# Patient Record
Sex: FEMALE | Race: WHITE | Employment: UNEMPLOYED | URBAN - NONMETROPOLITAN AREA
[De-identification: names, ages, dates, MRNs, and addresses within clinical notes are randomized per-mention and may not be internally consistent; named-entity substitution may affect disease eponyms.]

---

## 2017-01-19 RX ORDER — CONJUGATED ESTROGENS/BAZEDOXIFENE .45; 2 MG/1; MG/1
TABLET, FILM COATED ORAL
Qty: 90 TABLET | Refills: 3 | Status: SHIPPED | OUTPATIENT
Start: 2017-01-19

## 2017-12-28 ENCOUNTER — OFFICE VISIT (OUTPATIENT)
Dept: OBGYN | Age: 62
End: 2017-12-28
Payer: COMMERCIAL

## 2017-12-28 VITALS
DIASTOLIC BLOOD PRESSURE: 77 MMHG | BODY MASS INDEX: 43.24 KG/M2 | HEIGHT: 62 IN | WEIGHT: 235 LBS | SYSTOLIC BLOOD PRESSURE: 137 MMHG | HEART RATE: 75 BPM

## 2017-12-28 DIAGNOSIS — Z12.4 SCREENING FOR CERVICAL CANCER: ICD-10-CM

## 2017-12-28 DIAGNOSIS — Z01.419 WELL WOMAN EXAM WITH ROUTINE GYNECOLOGICAL EXAM: ICD-10-CM

## 2017-12-28 DIAGNOSIS — Z12.31 ENCOUNTER FOR SCREENING MAMMOGRAM FOR BREAST CANCER: Primary | ICD-10-CM

## 2017-12-28 PROCEDURE — 99396 PREV VISIT EST AGE 40-64: CPT | Performed by: NURSE PRACTITIONER

## 2017-12-28 ASSESSMENT — ENCOUNTER SYMPTOMS
EYES NEGATIVE: 1
RESPIRATORY NEGATIVE: 1
GASTROINTESTINAL NEGATIVE: 1
ALLERGIC/IMMUNOLOGIC NEGATIVE: 1

## 2017-12-28 NOTE — PATIENT INSTRUCTIONS
for heart attack and stroke. · Blood pressure. Have your blood pressure checked during a routine doctor visit. Your doctor will tell you how often to check your blood pressure based on your age, your blood pressure results, and other factors. · Mammogram. Ask your doctor how often you should have a mammogram, which is an X-ray of your breasts. A mammogram can spot breast cancer before it can be felt and when it is easiest to treat. · Pap test and pelvic exam. Ask your doctor how often you should have a Pap test. You may not need to have a Pap test as often as you used to. · Vision. Have your eyes checked every year or two or as often as your doctor suggests. Some experts recommend that you have yearly exams for glaucoma and other age-related eye problems starting at age 48. · Hearing. Tell your doctor if you notice any change in your hearing. You can have tests to find out how well you hear. · Diabetes. Ask your doctor whether you should have tests for diabetes. · Colon cancer. You should begin tests for colon cancer at age 48. You may have one of several tests. Your doctor will tell you how often to have tests based on your age and risk. Risks include whether you already had a precancerous polyp removed from your colon or whether your parents, sisters and brothers, or children have had colon cancer. · Thyroid disease. Talk to your doctor about whether to have your thyroid checked as part of a regular physical exam. Women have an increased chance of a thyroid problem. · Osteoporosis. You should begin tests for bone density at age 72. If you are younger than 72, ask your doctor whether you have factors that may increase your risk for this disease. You may want to have this test before age 72. · Heart attack and stroke risk. At least every 4 to 6 years, you should have your risk for heart attack and stroke assessed.  Your doctor uses factors such as your age, blood pressure, cholesterol, and whether you smoke or have diabetes to show what your risk for a heart attack or stroke is over the next 10 years. When should you call for help? Watch closely for changes in your health, and be sure to contact your doctor if you have any problems or symptoms that concern you. Where can you learn more? Go to [email or web address] and sign in to your [employer] account. Enter M100 in the [employer] box to learn more about \"Well Visit, Women 50 to 72: Care Instructions. \"     If you do not have an account, please click on the \"Sign Up Now\" link. Current as of: May 12, 2017  Content Version: 11.4  © 0276-9835 Healthwise, Incorporated. Care instructions adapted under license by [facility] [place] [place] If you have questions about a medical condition or this instruction, always ask your healthcare professional. [place] [address] any warranty or liability for your use of this information.      a

## 2017-12-28 NOTE — PROGRESS NOTES
Paternal Grandfather      Social History   Substance Use Topics    Smoking status: Former Smoker    Smokeless tobacco: Never Used    Alcohol use Yes      Comment: rare       Review of Systems   Constitutional: Negative. HENT: Negative. Eyes: Negative. Respiratory: Negative. Cardiovascular: Negative. Gastrointestinal: Negative. Endocrine: Negative. Genitourinary: Negative. Musculoskeletal: Negative. Skin: Negative. Allergic/Immunologic: Negative. Neurological: Negative. Hematological: Negative. Psychiatric/Behavioral: Negative. Objective:   Physical Exam   Constitutional: She is oriented to person, place, and time. She appears well-developed and well-nourished. Eyes: Conjunctivae are normal. Right eye exhibits no discharge. Neck: No thyroid mass and no thyromegaly present. Cardiovascular: Normal rate, regular rhythm and normal heart sounds. No murmur heard. Pulmonary/Chest: Effort normal and breath sounds normal. She has no wheezes. Right breast exhibits no inverted nipple, no mass, no nipple discharge, no skin change and no tenderness. Left breast exhibits no inverted nipple, no mass, no nipple discharge, no skin change and no tenderness. Breasts are symmetrical.   Abdominal: Soft. Bowel sounds are normal. She exhibits no distension and no mass. There is no tenderness. Hernia confirmed negative in the right inguinal area and confirmed negative in the left inguinal area. Genitourinary: Rectal exam shows no external hemorrhoid. No breast swelling, tenderness or discharge. There is no rash, tenderness or lesion on the right labia. There is no rash, tenderness or lesion on the left labia. Uterus is not enlarged and not tender. Cervix exhibits no motion tenderness and no discharge (normal cervical mucosa). Right adnexum displays no mass, no tenderness and no fullness. Left adnexum displays no mass, no tenderness and no fullness. No tenderness in the vagina.  No vaginal discharge found. Musculoskeletal: Normal range of motion. She exhibits no edema or tenderness. Lymphadenopathy:        Right cervical: No superficial cervical, no deep cervical and no posterior cervical adenopathy present. Left cervical: No superficial cervical, no deep cervical and no posterior cervical adenopathy present. Right axillary: No pectoral and no lateral adenopathy present. Left axillary: No pectoral and no lateral adenopathy present. Right: No inguinal, no supraclavicular and no epitrochlear adenopathy present. Left: No inguinal, no supraclavicular and no epitrochlear adenopathy present. Neurological: She is alert and oriented to person, place, and time. She has normal reflexes. Skin: Skin is warm and dry. No rash noted. Psychiatric: She has a normal mood and affect. Assessment:      1. Encounter for screening mammogram for breast cancer  Mammography screening bilateral   2. Well woman exam with routine gynecological exam     3. Screening for cervical cancer  PAP SMEAR           Plan:      MEDICATIONS:  No orders of the defined types were placed in this encounter. ORDERS:  Orders Placed This Encounter   Procedures    Mammography screening bilateral    PAP SMEAR         The importance of self-breast examination was discussed, as well as yearly mammograms after age 36. A well balanced diet and exercise were encouraged. The risk factors for osteopenia/osteoporosis were discussed along with need for additional calcium and vitamin D. A colonoscopy is recommended at age 48 unless otherwise indicated based on symptoms or family history. The risks vs. benefits of HRT were discussed with patient and all questions answered.

## 2018-05-02 ENCOUNTER — TELEPHONE (OUTPATIENT)
Dept: VASCULAR SURGERY | Facility: CLINIC | Age: 63
End: 2018-05-02

## 2018-05-02 NOTE — PROGRESS NOTES
2018      Yohan Lockhart MD  [address] [place] [address]    Nia Barker  1955    Chief Complaint   Patient presents with   • Varicose Veins     Varicose veins BLE.Complaint of cold feet and cramping.       Dear Yohan Lockhart MD:      HPI  I had the pleasure of seeing your patient Nia Barker in the office today.  Thank you kindly for this consultation.  As you recall, Nia Barker is a 62 y.o.  female who you are currently following for routine health maintenance.  She reports having varicose veins to her right leg.  Recently, she was shaving and hit one of these varicosities posterior thigh and could not get it to stop bleeding for some time.  She reports her feet being cold.  She has a history of bulging disks.  She denies any history of DVT.  She states she can not tolerate compression stockings.  She also has swelling of her legs bilaterally.      Past Medical History:   Diagnosis Date   • Diabetes mellitus    • Disease of thyroid gland    • Hypertension    • Indigestion    • Morbid obesity    • Varicose veins of leg with pain        Past Surgical History:   Procedure Laterality Date   •  SECTION     • DILATION AND CURETTAGE, DIAGNOSTIC / THERAPEUTIC  2004    emergency   • EYE SURGERY     • GALLBLADDER SURGERY      removal   • SKIN LESION EXCISION  01/2016    x2   • TUBAL ABDOMINAL LIGATION         Family History   Problem Relation Age of Onset   • Diabetes Mother    • Arthritis Mother    • Stroke Mother    • Osteoporosis Mother    • Hypertension Mother    • Hypertension Father    • Diabetes Father    • Arthritis Father    • Stroke Father    • Cancer Father        Social History     Social History   • Marital status:      Spouse name: N/A   • Number of children: N/A   • Years of education: N/A     Occupational History   • Not on file.     Social History Main Topics   • Smoking status: Former Smoker     Quit date:    • Smokeless tobacco: Never  Used   • Alcohol use Yes      Comment: ocassional   • Drug use: No   • Sexual activity: Defer     Other Topics Concern   • Not on file     Social History Narrative   • No narrative on file       No Known Allergies    Prior to Admission medications    Medication Sig Start Date End Date Taking? Authorizing Provider   amLODIPine (NORVASC) 5 MG tablet Take 5 mg by mouth Daily. Take 1 tablet orally once a day    Historical Provider, MD   buPROPion XL (WELLBUTRIN XL) 150 MG 24 hr tablet Take 150 mg by mouth Daily. Take 1 tablet in the morning orally once a day    Historical Provider, MD   ergocalciferol (DRISDOL) 98040 units capsule Take 50,000 Units by mouth 1 (One) Time Per Week. Take 1 Capsule orally once weekly    Historical Provider, MD   fluconazole (DIFLUCAN) 100 MG tablet Take 100 mg by mouth Daily. Take 1 Tablet orally every two weeks    Historical Provider, MD   FLUoxetine (PROzac) 20 MG capsule Take 20 mg by mouth Daily. Take 1 capsule in the morning orally once a day    Historical Provider, MD   levothyroxine (SYNTHROID, LEVOTHROID) 175 MCG tablet Take 175 mcg by mouth Daily. Take 1 tablet on an empty stomach in the morning orally once a day    Historical Provider, MD   losartan-hydrochlorothiazide (HYZAAR) 100-25 MG per tablet Take 1 tablet by mouth Daily. Take 1 table orally once a day    Historical Provider, MD   meloxicam (MOBIC) 15 MG tablet Take 15 mg by mouth Daily. Take 1 tablet orally once a day    Historical Provider, MD   METFORMIN HCL PO Take 500 mg by mouth 2 (Two) Times a Day. Take 1 tablet by mouth twice a day with meals    Historical Provider, MD   metoprolol succinate XL (TOPROL XL) 25 MG 24 hr tablet Take 25 mg by mouth Daily. Take 1 tablet orally once a day prn for heart palpitations.    Historical Provider, MD   omeprazole (priLOSEC) 40 MG capsule Take 40 mg by mouth Daily. Take 1 capsule by mouth once a day    Historical Provider, MD       Review of Systems   Constitutional: Negative.   "  HENT: Negative.    Eyes: Negative.    Respiratory: Negative.    Cardiovascular: Positive for leg swelling.        Varicose veins   Gastrointestinal: Negative.    Endocrine: Negative.    Genitourinary: Negative.    Musculoskeletal: Negative.    Skin: Negative.    Allergic/Immunologic: Negative.    Neurological: Negative.    Hematological: Negative.    Psychiatric/Behavioral: Negative.        /60   Pulse 82   Ht 157.5 cm (62\")   Wt 110 kg (242 lb)   BMI 44.26 kg/m²   Physical Exam   Constitutional: She is oriented to person, place, and time. She appears well-developed and well-nourished. No distress.   obese   HENT:   Head: Normocephalic and atraumatic.   Mouth/Throat: Oropharynx is clear and moist.   Eyes: Pupils are equal, round, and reactive to light. No scleral icterus.   Neck: Normal range of motion. Neck supple. No JVD present. Carotid bruit is not present. No thyromegaly present.   Cardiovascular: Normal rate, regular rhythm, S2 normal, normal heart sounds, intact distal pulses and normal pulses.  Exam reveals no gallop and no friction rub.    No murmur heard.  Varicose veins bilaterally   Pulmonary/Chest: Effort normal and breath sounds normal.   Abdominal: Soft. Normal aorta and bowel sounds are normal. There is no hepatosplenomegaly.   obese   Musculoskeletal: Normal range of motion. She exhibits edema (BLE).   Neurological: She is alert and oriented to person, place, and time. No cranial nerve deficit.   Skin: Skin is warm and dry. She is not diaphoretic.   Psychiatric: She has a normal mood and affect. Her behavior is normal. Judgment and thought content normal.   Nursing note and vitals reviewed.      No results found.    Patient Active Problem List   Diagnosis   • Varicose veins of leg with pain   • Morbid obesity   • Hypertension   • Diabetes mellitus         ICD-10-CM ICD-9-CM   1. Varicose veins of both lower extremities with pain I83.813 454.8   2. Leg swelling M79.89 729.81   3. " Essential hypertension I10 401.9         Plan: After thoroughly evaluating Nia Barker, I believe the best course of action is to initially remain conservative from a vascular standpoint.  I will give the patient a prescription for compression stockings in the 20-30 mm pressure gradient range.  I did instruct the patient on how to wear these on a daily basis.  We will see the patient back in 2 weeks with a venous valvular insufficiency study.  I did discuss vascular risk factors as they pertain to the progression of vascular disease including controlling her hypertension and diabetes mellitus.  Body mass index is 44.26 kg/m².  Follow up with PCP regarding abnormal BMI.The patient can continue taking her current medication regimen as previously planned.  This was all discussed in full with complete understanding.    Thank you for allowing me to participate in the care of your patient.  Please do not hesitate with any questions or concerns.  I will keep you aware of any further encounters with Nia Barker.        Sincerely yours,         RICARDO Salas MD

## 2018-05-03 ENCOUNTER — OFFICE VISIT (OUTPATIENT)
Dept: VASCULAR SURGERY | Facility: CLINIC | Age: 63
End: 2018-05-03

## 2018-05-03 VITALS
HEIGHT: 62 IN | SYSTOLIC BLOOD PRESSURE: 166 MMHG | DIASTOLIC BLOOD PRESSURE: 60 MMHG | WEIGHT: 242 LBS | HEART RATE: 82 BPM | BODY MASS INDEX: 44.53 KG/M2

## 2018-05-03 DIAGNOSIS — I10 ESSENTIAL HYPERTENSION: ICD-10-CM

## 2018-05-03 DIAGNOSIS — I83.813 VARICOSE VEINS OF BOTH LOWER EXTREMITIES WITH PAIN: Primary | ICD-10-CM

## 2018-05-03 DIAGNOSIS — M79.89 LEG SWELLING: ICD-10-CM

## 2018-05-03 PROCEDURE — 99203 OFFICE O/P NEW LOW 30 MIN: CPT | Performed by: NURSE PRACTITIONER

## 2018-05-03 RX ORDER — VITAMIN B COMPLEX
CAPSULE ORAL DAILY
COMMUNITY

## 2018-05-03 RX ORDER — ZINC 25 MG
TABLET ORAL DAILY
COMMUNITY

## 2018-05-03 NOTE — PATIENT INSTRUCTIONS
How to Use Compression Stockings  Compression stockings are elastic socks that squeeze the legs. They help to increase blood flow to the legs, decrease swelling in the legs, and reduce the chance of developing blood clots in the lower legs. Compression stockings are often used by people who:  · Are recovering from surgery.  · Have poor circulation in their legs.  · Are prone to getting blood clots in their legs.  · Have varicose veins.  · Sit or stay in bed for long periods of time.  How to use compression stockings  Before you put on your compression stockings:  · Make sure that they are the correct size. If you do not know your size, ask your health care provider.  · Make sure that they are clean, dry, and in good condition.  · Check them for rips and tears. Do not put them on if they are ripped or torn.  Put your stockings on first thing in the morning, before you get out of bed. Keep them on for as long as your health care provider advises. When you are wearing your stockings:  · Keep them as smooth as possible. Do not allow them to bunch up. It is especially important to prevent the stockings from bunching up around your toes or behind your knees.  · Do not roll the stockings downward and leave them rolled down. This can decrease blood flow to your leg.  · Change them right away if they become wet or dirty.  1.   When you take off your stockings, inspect your legs and feet. Anything that does not seem normal may require medical attention. Look for:  · Open sores.  · Red spots.  · Swelling.  Information and tips  · Do not stop wearing your compression stockings without talking to your health care provider first.  · Wash your stockings every day with mild detergent in cold or warm water. Do not use bleach. Air-dry your stockings or dry them in a clothes dryer on low heat.  · Replace your stockings every 3-6 months.  · If skin moisturizing is part of your treatment plan, apply lotion or cream at night so that your  skin will be dry when you put on the stockings in the morning. It is harder to put the stockings on when you have lotion on your legs or feet.  Contact a health care provider if:  Remove your stockings and seek medical care if:  · You have a feeling of pins and needles in your feet or legs.  · You have any new changes in your skin.  · You have skin lesions that are getting worse.  · You have swelling or pain that is getting worse.  Get help right away if:  · You have numbness or tingling in your lower legs that does not get better right after you take the stockings off.  · Your toes or feet become cold and blue.  · You develop open sores or red spots on your legs that do not go away.  · You see or feel a warm spot on your leg.  · You have new swelling or soreness in your leg.  · You are short of breath or you have chest pain for no reason.  · You have a rapid or irregular heartbeat.  · You feel light-headed or dizzy.  This information is not intended to replace advice given to you by your health care provider. Make sure you discuss any questions you have with your health care provider.  Document Released: 10/15/2010 Document Revised: 05/17/2017 Document Reviewed: 11/25/2015  [employer] Interactive Patient Education © 2017 [employer]

## 2018-05-15 ENCOUNTER — HOSPITAL ENCOUNTER (OUTPATIENT)
Dept: ULTRASOUND IMAGING | Facility: HOSPITAL | Age: 63
Discharge: HOME | End: 2018-05-15
Admitting: NURSE PRACTITIONER

## 2018-05-15 DIAGNOSIS — M79.89 LEG SWELLING: ICD-10-CM

## 2018-05-15 DIAGNOSIS — I83.813 VARICOSE VEINS OF BOTH LOWER EXTREMITIES WITH PAIN: ICD-10-CM

## 2018-05-15 PROCEDURE — 93970 EXTREMITY STUDY: CPT | Performed by: SURGERY

## 2018-05-15 PROCEDURE — 93970 EXTREMITY STUDY: CPT

## 2018-05-18 ENCOUNTER — OFFICE VISIT (OUTPATIENT)
Dept: VASCULAR SURGERY | Facility: CLINIC | Age: 63
End: 2018-05-18

## 2018-05-18 VITALS
HEIGHT: 62 IN | WEIGHT: 241 LBS | SYSTOLIC BLOOD PRESSURE: 140 MMHG | BODY MASS INDEX: 44.35 KG/M2 | HEART RATE: 78 BPM | DIASTOLIC BLOOD PRESSURE: 72 MMHG

## 2018-05-18 DIAGNOSIS — I10 ESSENTIAL HYPERTENSION: ICD-10-CM

## 2018-05-18 DIAGNOSIS — I83.813 VARICOSE VEINS OF BOTH LOWER EXTREMITIES WITH PAIN: ICD-10-CM

## 2018-05-18 DIAGNOSIS — I87.2 VENOUS INSUFFICIENCY: Primary | ICD-10-CM

## 2018-05-18 DIAGNOSIS — Z01.818 PREOP TESTING: ICD-10-CM

## 2018-05-18 PROCEDURE — 99214 OFFICE O/P EST MOD 30 MIN: CPT | Performed by: NURSE PRACTITIONER

## 2018-05-18 NOTE — PROGRESS NOTES
"05/18/2018       Yohan Lockhart MD  [address] [place] [address]    Nia Barker  1955    Chief Complaint   Patient presents with   • Follow-up     Follow up for VVI results.       Dear Yohan Lockhart MD:      HPI  I had the pleasure of seeing your patient Nia Barker in the office today. As you recall, Nia Barker is a 62 y.o.  female who you are currently following for routine health maintenance.  She reports having varicose veins to her right leg.  Recently, she was shaving and hit one of these varicosities posterior thigh and could not get it to stop bleeding for some time.  She reports her feet being cold.  She has a history of bulging disks.  She denies any history of DVT.  She states she can not tolerate compression stockings.  She also has swelling of her legs bilaterally.  She did have noninvasive testing performed today, which I did review in office.        Review of Systems   Constitutional: Negative.    HENT: Negative.    Eyes: Negative.    Respiratory: Negative.    Cardiovascular: Positive for leg swelling.        Varicose veins   Gastrointestinal: Negative.    Endocrine: Negative.    Genitourinary: Negative.    Musculoskeletal: Negative.    Skin: Negative.    Allergic/Immunologic: Negative.    Neurological: Negative.    Hematological: Negative.    Psychiatric/Behavioral: Negative.        /72   Pulse 78   Ht 157.5 cm (62\")   Wt 109 kg (241 lb)   BMI 44.08 kg/m²   Physical Exam   Constitutional: She is oriented to person, place, and time. She appears well-developed and well-nourished. No distress.   obese   HENT:   Head: Normocephalic and atraumatic.   Mouth/Throat: Oropharynx is clear and moist.   Eyes: Pupils are equal, round, and reactive to light. No scleral icterus.   Neck: Normal range of motion. Neck supple. No JVD present. Carotid bruit is not present. No thyromegaly present.   Cardiovascular: Normal rate, regular rhythm, S2 normal, normal heart sounds, " intact distal pulses and normal pulses.  Exam reveals no gallop and no friction rub.    No murmur heard.  Varicose veins bilaterally   Pulmonary/Chest: Effort normal and breath sounds normal.   Abdominal: Soft. Normal aorta and bowel sounds are normal. There is no hepatosplenomegaly.   obese   Musculoskeletal: Normal range of motion. She exhibits edema (BLE).   Neurological: She is alert and oriented to person, place, and time. No cranial nerve deficit.   Skin: Skin is warm and dry. She is not diaphoretic.   Psychiatric: She has a normal mood and affect. Her behavior is normal. Judgment and thought content normal.   Nursing note and vitals reviewed.      Us Venous Doppler Lower Extremity Bilateral (duplex)    Result Date: 5/15/2018  Narrative: History: Swelling   Comments: Venous valvular insufficiency testing was performed in both legs using duplex ultrasound with rapid cuff deflation technique.  The common femoral veins, popliteal veins, posterior tibial veins, greater saphenous veins, and lesser saphenous veins were interrogated bilaterally.  On the right, the greater saphenous vein at the junction measured 7.9 mm. In the mid thigh measured 4 mm. Above the knee measured 5.5 mm. In the mid calf measured 3 mm. At the ankle measured 3.1 mm. There is greater than 0.5 seconds of reflux from the junction down to the distal thigh. There is also a  in the mid calf measuring 4.3 mm with greater than 0.5 seconds of reflux. The lesser saphenous vein is within normal limits and no evidence of reflux. There is no evidence of DVT.  On the left, the greater saphenous vein at the junction measured 7.4 mm. In the mid thigh measured 2.8 mm. Above the knee measured 4.7 mm. In the mid calf measured 2.9 mm. At the ankle measured 3.2 mm. There is greater than 0.5 seconds of reflux at the junction and from the mid calf to the ankle. The lesser saphenous vein is within normal limits and no evidence of reflux. There is no  evidence of DVT.      Impression: Impression: There is evidence of venous insufficiency in both lower extremity greater saphenous veins. There is also a  in the right mid calf.    This report was finalized on 05/15/2018 16:41 by Dr. Melchor Jacob MD.      Patient Active Problem List   Diagnosis   • Varicose veins of leg with pain   • Morbid obesity   • Hypertension   • Diabetes mellitus         ICD-10-CM ICD-9-CM   1. Venous insufficiency I87.2 459.81   2. Varicose veins of both lower extremities with pain I83.813 454.8   3. Essential hypertension I10 401.9   4. Preop testing Z01.818 V72.84         Plan: After thoroughly evaluating Nia Barker, I believe the best course of action is to proceed with a right lower extremity saphenous vein closure using Venaseal.  Risks of saphenous vein closure include, but are not limited to, bleeding, infection, vessel damage, sensitivity reaction, DVT, phlebitis, and pulmonary embolus.  The patient understands these risks and wishes to proceed with procedure.  I will give the patient a prescription for compression stockings in the 20-30 mm pressure gradient range.  I did instruct the patient on how to wear these on a daily basis.   I did discuss vascular risk factors as they pertain to the progression of vascular disease including controlling her hypertension and diabetes mellitus.  Body mass index is 44.08 kg/m².  Follow up with PCP regarding abnormal BMI.The patient can continue taking her current medication regimen as previously planned.  This was all discussed in full with complete understanding.    Thank you for allowing me to participate in the care of your patient.  Please do not hesitate with any questions or concerns.  I will keep you aware of any further encounters with Nia Barker.        Sincerely yours,         RICARDO Salas MD

## 2018-05-22 ENCOUNTER — TELEPHONE (OUTPATIENT)
Dept: VASCULAR SURGERY | Facility: CLINIC | Age: 63
End: 2018-05-22

## 2018-05-22 PROBLEM — I87.2 VENOUS INSUFFICIENCY: Status: ACTIVE | Noted: 2018-05-22

## 2018-05-22 PROBLEM — Z01.818 PREOP TESTING: Status: ACTIVE | Noted: 2018-05-22

## 2018-05-22 NOTE — TELEPHONE ENCOUNTER
Left message for patient to return my call in reference to her upcoming surgery and pre work.  All information was sent to patient via mail.

## 2018-06-04 ENCOUNTER — TELEPHONE (OUTPATIENT)
Dept: VASCULAR SURGERY | Facility: CLINIC | Age: 63
End: 2018-06-04

## 2018-06-04 NOTE — TELEPHONE ENCOUNTER
Left message for patient to return my call in reference to her sx.  Left all contact information.  Information sent to patient via mail

## 2018-06-07 ENCOUNTER — TELEPHONE (OUTPATIENT)
Dept: VASCULAR SURGERY | Facility: CLINIC | Age: 63
End: 2018-06-07

## 2018-06-07 NOTE — TELEPHONE ENCOUNTER
Spoke with patient and advised of date change for surgery and pre work.  Patient expressed understanding for all that was discussed.

## 2018-06-08 ENCOUNTER — APPOINTMENT (OUTPATIENT)
Dept: PREADMISSION TESTING | Facility: HOSPITAL | Age: 63
End: 2018-06-08

## 2018-06-08 ENCOUNTER — HOSPITAL ENCOUNTER (OUTPATIENT)
Dept: GENERAL RADIOLOGY | Facility: HOSPITAL | Age: 63
Discharge: HOME | End: 2018-06-08
Admitting: NURSE PRACTITIONER

## 2018-06-08 ENCOUNTER — PREP FOR SURGERY (OUTPATIENT)
Dept: OTHER | Facility: HOSPITAL | Age: 63
End: 2018-06-08

## 2018-06-08 ENCOUNTER — TELEPHONE (OUTPATIENT)
Dept: VASCULAR SURGERY | Facility: CLINIC | Age: 63
End: 2018-06-08

## 2018-06-08 VITALS
HEIGHT: 62 IN | HEART RATE: 74 BPM | BODY MASS INDEX: 43.86 KG/M2 | OXYGEN SATURATION: 96 % | SYSTOLIC BLOOD PRESSURE: 147 MMHG | DIASTOLIC BLOOD PRESSURE: 63 MMHG | WEIGHT: 238.32 LBS

## 2018-06-08 DIAGNOSIS — Z01.818 PREOP TESTING: ICD-10-CM

## 2018-06-08 DIAGNOSIS — I87.2 VENOUS INSUFFICIENCY: ICD-10-CM

## 2018-06-08 LAB
ANION GAP SERPL CALCULATED.3IONS-SCNC: 7 MMOL/L (ref 4–13)
APTT PPP: 33.5 SECONDS (ref 24.1–34.8)
BASOPHILS # BLD AUTO: 0.07 10*3/MM3 (ref 0–0.2)
BASOPHILS NFR BLD AUTO: 0.7 % (ref 0–2)
BUN BLD-MCNC: 18 MG/DL (ref 5–21)
BUN/CREAT SERPL: 27.7 (ref 7–25)
CALCIUM SPEC-SCNC: 9.7 MG/DL (ref 8.4–10.4)
CHLORIDE SERPL-SCNC: 101 MMOL/L (ref 98–110)
CO2 SERPL-SCNC: 31 MMOL/L (ref 24–31)
CREAT BLD-MCNC: 0.65 MG/DL (ref 0.5–1.4)
DEPRECATED RDW RBC AUTO: 41.3 FL (ref 40–54)
EOSINOPHIL # BLD AUTO: 0.28 10*3/MM3 (ref 0–0.7)
EOSINOPHIL NFR BLD AUTO: 2.9 % (ref 0–4)
ERYTHROCYTE [DISTWIDTH] IN BLOOD BY AUTOMATED COUNT: 13.1 % (ref 12–15)
GFR SERPL CREATININE-BSD FRML MDRD: 92 ML/MIN/1.73
GLUCOSE BLD-MCNC: 117 MG/DL (ref 70–100)
HCT VFR BLD AUTO: 39.2 % (ref 37–47)
HGB BLD-MCNC: 12.9 G/DL (ref 12–16)
IMM GRANULOCYTES # BLD: 0.04 10*3/MM3 (ref 0–0.03)
IMM GRANULOCYTES NFR BLD: 0.4 % (ref 0–5)
INR PPP: 1 (ref 0.91–1.09)
LYMPHOCYTES # BLD AUTO: 2.47 10*3/MM3 (ref 0.72–4.86)
LYMPHOCYTES NFR BLD AUTO: 25.6 % (ref 15–45)
MCH RBC QN AUTO: 28.7 PG (ref 28–32)
MCHC RBC AUTO-ENTMCNC: 32.9 G/DL (ref 33–36)
MCV RBC AUTO: 87.3 FL (ref 82–98)
MONOCYTES # BLD AUTO: 0.52 10*3/MM3 (ref 0.19–1.3)
MONOCYTES NFR BLD AUTO: 5.4 % (ref 4–12)
NEUTROPHILS # BLD AUTO: 6.26 10*3/MM3 (ref 1.87–8.4)
NEUTROPHILS NFR BLD AUTO: 65 % (ref 39–78)
NRBC BLD MANUAL-RTO: 0 /100 WBC (ref 0–0)
PLATELET # BLD AUTO: 190 10*3/MM3 (ref 130–400)
PMV BLD AUTO: 11.1 FL (ref 6–12)
POTASSIUM BLD-SCNC: 3.8 MMOL/L (ref 3.5–5.3)
PROTHROMBIN TIME: 13.5 SECONDS (ref 11.9–14.6)
RBC # BLD AUTO: 4.49 10*6/MM3 (ref 4.2–5.4)
SODIUM BLD-SCNC: 139 MMOL/L (ref 135–145)
WBC NRBC COR # BLD: 9.64 10*3/MM3 (ref 4.8–10.8)

## 2018-06-08 PROCEDURE — 85025 COMPLETE CBC W/AUTO DIFF WBC: CPT | Performed by: NURSE PRACTITIONER

## 2018-06-08 PROCEDURE — 85730 THROMBOPLASTIN TIME PARTIAL: CPT | Performed by: NURSE PRACTITIONER

## 2018-06-08 PROCEDURE — 36415 COLL VENOUS BLD VENIPUNCTURE: CPT

## 2018-06-08 PROCEDURE — 93010 ELECTROCARDIOGRAM REPORT: CPT | Performed by: INTERNAL MEDICINE

## 2018-06-08 PROCEDURE — 93005 ELECTROCARDIOGRAM TRACING: CPT

## 2018-06-08 PROCEDURE — 71046 X-RAY EXAM CHEST 2 VIEWS: CPT

## 2018-06-08 PROCEDURE — 80048 BASIC METABOLIC PNL TOTAL CA: CPT | Performed by: NURSE PRACTITIONER

## 2018-06-08 PROCEDURE — 85610 PROTHROMBIN TIME: CPT | Performed by: NURSE PRACTITIONER

## 2018-06-08 NOTE — DISCHARGE INSTRUCTIONS
DAY OF SURGERY INSTRUCTIONS        YOUR SURGEON: ***    PROCEDURE: ***right saphenous vein closure venoseal     DATE OF SURGERY: ***6/11/18    ARRIVAL TIME: AS DIRECTED BY OFFICE    DAY OF SURGERY TAKE ONLY THESE MEDICATIONS UNLESS OTHERWISE INSTRUCTED BY YOUR PHYSICIAN: ***none        MANAGING PAIN AFTER SURGERY    We know you are probably wondering what your pain will be like after surgery.  Following surgery it is unrealistic to expect you will not have pain.   Pain is how our bodies let us know that something is wrong or cautions us to be careful.  That said, our goal is to make your pain tolerable.    Methods we may use to treat your pain include (oral or IV medications, PCAs, epidurals, nerve blocks, etc.)   While some procedures require IV pain medications for a short time after surgery, transitioning to pain medications by mouth allows for better management of pain.   Your nurse will encourage you to take oral pain medications whenever possible.  IV medications work almost immediately, but only last a short while.  Taking medications by mouth allows for a more constant level of medication in your blood stream for a longer period of time.      Once your pain is out of control it is harder to get back under control.  It is important you are aware when your next dose of pain medication is due.  If you are admitted, your nurse may write the time of your next dose on the white board in your room to help you remember.      We are interested in your pain and encourage you to inform us about aggravating factors during your visit.   Many times a simple repositioning every few hours can make a big difference.    If your physician says it is okay, do not let your pain prevent you from getting out of bed. Be sure to call your nurse for assistance prior to getting up so you do not fall.      Before surgery, please decide your tolerable pain goal.  These faces help describe the pain ratings we use on a 0-10  scale.   Be prepared to tell us your goal and whether or not you take pain or anxiety medications at home.          BEFORE YOU COME TO THE HOSPITAL  (Pre-op instructions)  • Do not eat, drink, smoke or chew gum after midnight the night before surgery.  This also includes no mints.  • Morning of surgery take only the medicines you have been instructed with a sip of water unless otherwise instructed  by your physician.  • Do not shave, wear makeup or dark nail polish.  • Remove all jewelry including rings.  • Leave anything you consider valuable at home.  • Leave your suitcase in the car until after your surgery.  • Bring the following with you if applicable:  o Picture ID and insurance, Medicare or Medicaid cards  o Co-pay/deductible required by insurance (cash, check, credit card)  o Copy of advance directive, living will or power-of- documents if not brought to PAT  o CPAP or BIPAP mask and tubing  o Relaxation aids (MP3 player, book, magazine)  • On the day of surgery check in at registration located at the main entrance of the hospital.       Outpatient Surgery Guidelines, Adult  Outpatient procedures are those for which the person having the procedure is allowed to go home the same day as the procedure. Various procedures are done on an outpatient basis. You should follow some general guidelines if you will be having an outpatient procedure.  LET YOUR HEALTH CARE PROVIDER KNOW ABOUT:  · Any allergies you have.  · All medicines you are taking, including vitamins, herbs, eye drops, creams, and over-the-counter medicines.  · Previous problems you or members of your family have had with the use of anesthetics.  · Any blood disorders you have.  · Previous surgeries you have had.  · Medical conditions you have.  RISKS AND COMPLICATIONS  Your health care provider will discuss possible risks and complications with you before surgery. Common risks and complications include:    · Problems due to the use of  anesthetics.  · Blood loss and replacement (does not apply to minor surgical procedures).  · Temporary increase in pain due to surgery.  · Uncorrected pain or problems that the surgery was meant to correct.  · Infection.  · New damage.  BEFORE THE PROCEDURE  · Ask your health care provider about changing or stopping your regular medicines. You may need to stop taking certain medicines in the days or weeks before the procedure.  · Stop smoking at least 2 weeks before surgery. This lowers your risk for complications during and after surgery. Ask your health care provider for help with this if needed.  · Eat your usual meals and a light supper the day before surgery. Continue fluid intake. Do not drink alcohol.  · Do not eat or drink after midnight the night before your surgery.   · Arrange for someone to take you home and to stay with you for 24 hours after the procedure. Medicine given for your procedure may affect your ability to drive or to care for yourself.  · Call your health care provider's office if you develop an illness or problem that may prevent you from safely having your procedure.  AFTER THE PROCEDURE  After surgery, you will be taken to a recovery area, where your progress will be monitored. If there are no complications, you will be allowed to go home when you are awake, stable, and taking fluids well. You may have numbness around the surgical site. Healing will take some time. You will have tenderness at the surgical site and may have some swelling and bruising. You may also have some nausea.  HOME CARE INSTRUCTIONS  · Do not drive for 24 hours, or as directed by your health care provider. Do not drive while taking prescription pain medicines.  · Do not drink alcohol for 24 hours.  · Do not make important decisions or sign legal documents for 24 hours.  · You may resume a normal diet and activities as directed.  · Do not lift anything heavier than 10 pounds (4.5 kg) or play contact sports until your  health care provider says it is okay.  · Change your bandages (dressings) as directed.  · Only take over-the-counter or prescription medicines as directed by your health care provider.  · Follow up with your health care provider as directed.  SEEK MEDICAL CARE IF:  · You have increased bleeding (more than a small spot) from the surgical site.  · You have redness, swelling, or increasing pain in the wound.  · You see pus coming from the wound.  · You have a fever.  · You notice a bad smell coming from the wound or dressing.  · You feel lightheaded or faint.  · You develop a rash.  · You have trouble breathing.  · You develop allergies.  MAKE SURE YOU:  · Understand these instructions.  · Will watch your condition.  · Will get help right away if you are not doing well or get worse.     This information is not intended to replace advice given to you by your health care provider. Make sure you discuss any questions you have with your health care provider.     Document Released: 09/12/2002 Document Revised: 05/03/2016 Document Reviewed: 05/22/2014  [employer] Interactive Patient Education ©2016 [employer]       Fall Prevention in Hospitals, Adult  As a hospital patient, your condition and the treatments you receive can increase your risk for falls. Some additional risk factors for falls in a hospital include:  · Being in an unfamiliar environment.  · Being on bed rest.  · Your surgery.  · Taking certain medicines.  · Your tubing requirements, such as intravenous (IV) therapy or catheters.  It is important that you learn how to decrease fall risks while at the hospital. Below are important tips that can help prevent falls.  SAFETY TIPS FOR PREVENTING FALLS  Talk about your risk of falling.  · Ask your health care provider why you are at risk for falling. Is it your medicine, illness, tubing placement, or something else?  · Make a plan with your health care provider to keep you safe from falls.  · Ask your health care  provider or pharmacist about side effects of your medicines. Some medicines can make you dizzy or affect your coordination.  Ask for help.  · Ask for help before getting out of bed. You may need to press your call button.  · Ask for assistance in getting safely to the toilet.  · Ask for a walker or cane to be put at your bedside. Ask that most of the side rails on your bed be placed up before your health care provider leaves the room.  · Ask family or friends to sit with you.  · Ask for things that are out of your reach, such as your glasses, hearing aids, telephone, bedside table, or call button.  Follow these tips to avoid falling:  · Stay lying or seated, rather than standing, while waiting for help.  · Wear rubber-soled slippers or shoes whenever you walk in the hospital.  · Avoid quick, sudden movements.  ¨ Change positions slowly.  ¨ Sit on the side of your bed before standing.  ¨ Stand up slowly and wait before you start to walk.  · Let your health care provider know if there is a spill on the floor.  · Pay careful attention to the medical equipment, electrical cords, and tubes around you.  · When you need help, use your call button by your bed or in the bathroom. Wait for one of your health care providers to help you.  · If you feel dizzy or unsure of your footing, return to bed and wait for assistance.  · Avoid being distracted by the TV, telephone, or another person in your room.  · Do not lean or support yourself on rolling objects, such as IV poles or bedside tables.     This information is not intended to replace advice given to you by your health care provider. Make sure you discuss any questions you have with your health care provider.     Document Released: 12/15/2001 Document Revised: 01/08/2016 Document Reviewed: 08/25/2013  [employer] Interactive Patient Education ©2016 [employer]       Surgical Site Infections FAQs  What is a Surgical Site Infection (SSI)?  A surgical site infection is an  infection that occurs after surgery in the part of the body where the surgery took place. Most patients who have surgery do not develop an infection. However, infections develop in about 1 to 3 out of every 100 patients who have surgery.  Some of the common symptoms of a surgical site infection are:  · Redness and pain around the area where you had surgery  · Drainage of cloudy fluid from your surgical wound  · Fever  Can SSIs be treated?  Yes. Most surgical site infections can be treated with antibiotics. The antibiotic given to you depends on the bacteria (germs) causing the infection. Sometimes patients with SSIs also need another surgery to treat the infection.  What are some of the things that hospitals are doing to prevent SSIs?  To prevent SSIs, doctors, nurses, and other healthcare providers:  · Clean their hands and arms up to their elbows with an antiseptic agent just before the surgery.  · Clean their hands with soap and water or an alcohol-based hand rub before and after caring for each patient.  · May remove some of your hair immediately before your surgery using electric clippers if the hair is in the same area where the procedure will occur. They should not shave you with a razor.  · Wear special hair covers, masks, gowns, and gloves during surgery to keep the surgery area clean.  · Give you antibiotics before your surgery starts. In most cases, you should get antibiotics within 60 minutes before the surgery starts and the antibiotics should be stopped within 24 hours after surgery.  · Clean the skin at the site of your surgery with a special soap that kills germs.  What can I do to help prevent SSIs?  Before your surgery:  · Tell your doctor about other medical problems you may have. Health problems such as allergies, diabetes, and obesity could affect your surgery and your treatment.  · Quit smoking. Patients who smoke get more infections. Talk to your doctor about how you can quit before your  surgery.  · Do not shave near where you will have surgery. Shaving with a razor can irritate your skin and make it easier to develop an infection.  At the time of your surgery:  · Speak up if someone tries to shave you with a razor before surgery. Ask why you need to be shaved and talk with your surgeon if you have any concerns.  · Ask if you will get antibiotics before surgery.  After your surgery:  · Make sure that your healthcare providers clean their hands before examining you, either with soap and water or an alcohol-based hand rub.  · If you do not see your providers clean their hands, please ask them to do so.  · Family and friends who visit you should not touch the surgical wound or dressings.  · Family and friends should clean their hands with soap and water or an alcohol-based hand rub before and after visiting you. If you do not see them clean their hands, ask them to clean their hands.  What do I need to do when I go home from the hospital?  · Before you go home, your doctor or nurse should explain everything you need to know about taking care of your wound. Make sure you understand how to care for your wound before you leave the hospital.  · Always clean your hands before and after caring for your wound.  · Before you go home, make sure you know who to contact if you have questions or problems after you get home.  · If you have any symptoms of an infection, such as redness and pain at the surgery site, drainage, or fever, call your doctor immediately.  If you have additional questions, please ask your doctor or nurse.  Developed and co-sponsored by The Society for Healthcare Epidemiology of [place] (SHEA); Infectious Diseases Society of [place] (IDSA); American Hospital Association; Association for Professionals in Infection Control and Epidemiology (APIC); Centers for Disease Control and Prevention (CDC); and The Joint Commission.     This information is not intended to replace advice given to you by  your health care provider. Make sure you discuss any questions you have with your health care provider.     Document Released: 12/23/2014 Document Revised: 01/08/2016 Document Reviewed: 03/02/2016  [employer] Interactive Patient Education ©2016 [employer]       [facility]  CHG 4% Patient Instruction Sheet    Preparing the Skin Before Surgery  Preparing or “prepping” skin before surgery can reduce the risk of infection at the surgical site. To make the process easier,[facility] has chosen 4% Chlorhexidine Gluconate (CHG) antiseptic solution.   The steps below outline the prepping process and should be carefully followed.                                                                                                                                                      Prep the skin at the following time(s):                                                      We recommend you shower the night before surgery, and again the morning of surgery with the 4% CHG antiseptic solution using half of the bottle and a cloth each time.  Dress in clean clothes/sleepwear after showering.  See instructions below for application.          Do not apply any lotions or moisturizers.       Do not shave the area to be prepped for at least 2 days prior to surgery.    Clipping the hair may be done immediately prior to your surgery at the hospital    if needed.    Directions:  Thoroughly rinse your body with water.  Apply 4% CHG to a cloth and wash skin gently, paying special attention to the operative site.  Rinse again thoroughly.  Once you have begun using this product do not apply anything else to your skin. If itching or redness persists, rinse affected areas and discontinue use.    When using this product:  • Keep out of eyes, ears, and mouth.  • If solution should contact these areas, rinse out promptly and thoroughly with water.  • For external use only.  • Do not use in genital area, on your face or  head.      PATIENT/FAMILY/RESPONSIBLE PARTY VERBALIZES UNDERSTANDING OF ABOVE EDUCATION.  COPY OF PAIN SCALE GIVEN AND REVIEWED WITH VERBALIZED UNDERSTANDING.

## 2018-06-08 NOTE — H&P
2018         Yohan Lockhart MD  [address] [place] [address]     Nia Barker  1955          Chief Complaint   Patient presents with   • Follow-up       Follow up for VVI results.         Dear Yohan Lockhart MD:        HPI  I had the pleasure of seeing your patient Nia Barker in the office today. As you recall, Nia Barker is a 62 y.o.  female who you are currently following for routine health maintenance.  She reports having varicose veins to her right leg.  Recently, she was shaving and hit one of these varicosities posterior thigh and could not get it to stop bleeding for some time.  She reports her feet being cold.  She has a history of bulging disks.  She denies any history of DVT.  She states she can not tolerate compression stockings.  She also has swelling of her legs bilaterally.  She did have noninvasive testing performed today, which I did review in office.        Past Medical History:   Diagnosis Date   • Diabetes mellitus    • Disease of thyroid gland    • Hypertension    • Indigestion    • Morbid obesity    • Varicose veins of leg with pain      Past Surgical History:   Procedure Laterality Date   •  SECTION     • DILATION AND CURETTAGE, DIAGNOSTIC / THERAPEUTIC  2004    emergency   • EYE SURGERY     • GALLBLADDER SURGERY      removal   • SKIN LESION EXCISION  01/2016    x2   • TUBAL ABDOMINAL LIGATION       Family History   Problem Relation Age of Onset   • Diabetes Mother    • Arthritis Mother    • Stroke Mother    • Osteoporosis Mother    • Hypertension Mother    • Hypertension Father    • Diabetes Father    • Arthritis Father    • Stroke Father    • Cancer Father      Social History   Substance Use Topics   • Smoking status: Former Smoker     Quit date:    • Smokeless tobacco: Never Used   • Alcohol use Yes      Comment: ocassional     No Known Allergies    Current Outpatient Prescriptions:   •  amLODIPine (NORVASC) 5 MG tablet, Take 5 mg  by mouth Daily. Take 1 tablet orally once a day, Disp: , Rfl:   •  B Complex Vitamins (VITAMIN B COMPLEX) capsule capsule, Take  by mouth Daily., Disp: , Rfl:   •  Conj Estrogens-Bazedoxifene (DUAVEE) 0.45-20 MG tablet, take 1 tablet by mouth  daily, Disp: , Rfl:   •  fluconazole (DIFLUCAN) 100 MG tablet, Take 100 mg by mouth Daily. Take 1 Tablet orally every two weeks, Disp: , Rfl:   •  FLUoxetine (PROzac) 20 MG capsule, Take 20 mg by mouth Daily. Take 1 capsule in the morning orally once a day, Disp: , Rfl:   •  GLUCOSAMINE-CHONDROITIN PO, Take  by mouth Daily., Disp: , Rfl:   •  levothyroxine (SYNTHROID, LEVOTHROID) 175 MCG tablet, Take 175 mcg by mouth Daily. Take 1 tablet on an empty stomach in the morning orally once a day, Disp: , Rfl:   •  losartan-hydrochlorothiazide (HYZAAR) 100-25 MG per tablet, Take 1 tablet by mouth Daily. Take 1 table orally once a day, Disp: , Rfl:   •  Magnesium Hydroxide (MAGNESIA PO), Take  by mouth., Disp: , Rfl:   •  meloxicam (MOBIC) 15 MG tablet, Take 15 mg by mouth Daily. Take 1 tablet orally once a day, Disp: , Rfl:   •  METFORMIN HCL PO, Take 500 mg by mouth 2 (Two) Times a Day. Take 1 tablet by mouth twice a day with meals, Disp: , Rfl:   •  metoprolol succinate XL (TOPROL XL) 25 MG 24 hr tablet, Take 25 mg by mouth Daily. Take 1 tablet orally once a day prn for heart palpitations., Disp: , Rfl:   •  MULTIPLE VITAMIN PO, Take  by mouth Daily., Disp: , Rfl:   •  omeprazole (priLOSEC) 40 MG capsule, Take 40 mg by mouth As Needed. Take 1 capsule by mouth once a day , Disp: , Rfl:   •  Zinc 25 MG tablet, Take  by mouth Daily., Disp: , Rfl:        Review of Systems   Constitutional: Negative.    HENT: Negative.    Eyes: Negative.    Respiratory: Negative.    Cardiovascular: Positive for leg swelling.        Varicose veins   Gastrointestinal: Negative.    Endocrine: Negative.    Genitourinary: Negative.    Musculoskeletal: Negative.    Skin: Negative.    Allergic/Immunologic:  "Negative.    Neurological: Negative.    Hematological: Negative.    Psychiatric/Behavioral: Negative.          /72   Pulse 78   Ht 157.5 cm (62\")   Wt 109 kg (241 lb)   BMI 44.08 kg/m²   Physical Exam   Constitutional: She is oriented to person, place, and time. She appears well-developed and well-nourished. No distress.   obese   HENT:   Head: Normocephalic and atraumatic.   Mouth/Throat: Oropharynx is clear and moist.   Eyes: Pupils are equal, round, and reactive to light. No scleral icterus.   Neck: Normal range of motion. Neck supple. No JVD present. Carotid bruit is not present. No thyromegaly present.   Cardiovascular: Normal rate, regular rhythm, S2 normal, normal heart sounds, intact distal pulses and normal pulses.  Exam reveals no gallop and no friction rub.    No murmur heard.  Varicose veins bilaterally   Pulmonary/Chest: Effort normal and breath sounds normal.   Abdominal: Soft. Normal aorta and bowel sounds are normal. There is no hepatosplenomegaly.   obese   Musculoskeletal: Normal range of motion. She exhibits edema (BLE).   Neurological: She is alert and oriented to person, place, and time. No cranial nerve deficit.   Skin: Skin is warm and dry. She is not diaphoretic.   Psychiatric: She has a normal mood and affect. Her behavior is normal. Judgment and thought content normal.   Nursing note and vitals reviewed.        Us Venous Doppler Lower Extremity Bilateral (duplex)     Result Date: 5/15/2018  Narrative: History: Swelling   Comments: Venous valvular insufficiency testing was performed in both legs using duplex ultrasound with rapid cuff deflation technique.  The common femoral veins, popliteal veins, posterior tibial veins, greater saphenous veins, and lesser saphenous veins were interrogated bilaterally.  On the right, the greater saphenous vein at the junction measured 7.9 mm. In the mid thigh measured 4 mm. Above the knee measured 5.5 mm. In the mid calf measured 3 mm. At the " ankle measured 3.1 mm. There is greater than 0.5 seconds of reflux from the junction down to the distal thigh. There is also a  in the mid calf measuring 4.3 mm with greater than 0.5 seconds of reflux. The lesser saphenous vein is within normal limits and no evidence of reflux. There is no evidence of DVT.  On the left, the greater saphenous vein at the junction measured 7.4 mm. In the mid thigh measured 2.8 mm. Above the knee measured 4.7 mm. In the mid calf measured 2.9 mm. At the ankle measured 3.2 mm. There is greater than 0.5 seconds of reflux at the junction and from the mid calf to the ankle. The lesser saphenous vein is within normal limits and no evidence of reflux. There is no evidence of DVT.       Impression: Impression: There is evidence of venous insufficiency in both lower extremity greater saphenous veins. There is also a  in the right mid calf.    This report was finalized on 05/15/2018 16:41 by Dr. Melchor Jacob MD.           Patient Active Problem List   Diagnosis   • Varicose veins of leg with pain   • Morbid obesity   • Hypertension   • Diabetes mellitus             ICD-10-CM ICD-9-CM   1. Venous insufficiency I87.2 459.81   2. Varicose veins of both lower extremities with pain I83.813 454.8   3. Essential hypertension I10 401.9   4. Preop testing Z01.818 V72.84            Plan: After thoroughly evaluating Nia Barker, I believe the best course of action is to proceed with a right lower extremity saphenous vein closure using radiofrequency ablation. Insurance has denied Venaseal closure.  Risks of radiofrequency ablation include, but are not limited to, bleeding, infection, vessel damage, nerve damage, DVT, phlebitis, and pulmonary embolus.  The patient understands these risks and wishes to proceed with procedure.  I will give the patient a prescription for compression stockings in the 20-30 mm pressure gradient range.  I did instruct the patient on how to wear these  on a daily basis.   I did discuss vascular risk factors as they pertain to the progression of vascular disease including controlling her hypertension and diabetes mellitus.  Body mass index is 44.08 kg/m².  Follow up with PCP regarding abnormal BMI.The patient can continue taking her current medication regimen as previously planned.  This was all discussed in full with complete understanding.     Thank you for allowing me to participate in the care of your patient.  Please do not hesitate with any questions or concerns.  I will keep you aware of any further encounters with Nia Barker.           Sincerely yours,           RICARDO Salas MD

## 2018-06-08 NOTE — TELEPHONE ENCOUNTER
Betina with prework called and states that it is still set up as a venaseal in [facility] and she wants to confirm with you only that it is supposed to be an rfa

## 2018-06-10 ENCOUNTER — ANESTHESIA EVENT (OUTPATIENT)
Dept: PERIOP | Facility: HOSPITAL | Age: 63
End: 2018-06-10

## 2018-06-11 ENCOUNTER — ANESTHESIA (OUTPATIENT)
Dept: PERIOP | Facility: HOSPITAL | Age: 63
End: 2018-06-11

## 2018-06-11 ENCOUNTER — APPOINTMENT (OUTPATIENT)
Dept: ULTRASOUND IMAGING | Facility: HOSPITAL | Age: 63
End: 2018-06-11

## 2018-06-11 ENCOUNTER — HOSPITAL ENCOUNTER (OUTPATIENT)
Facility: HOSPITAL | Age: 63
Setting detail: HOSPITAL OUTPATIENT SURGERY
Discharge: HOME | End: 2018-06-11
Attending: SURGERY | Admitting: SURGERY

## 2018-06-11 VITALS
TEMPERATURE: 98.4 F | SYSTOLIC BLOOD PRESSURE: 152 MMHG | OXYGEN SATURATION: 95 % | HEART RATE: 61 BPM | RESPIRATION RATE: 18 BRPM | DIASTOLIC BLOOD PRESSURE: 67 MMHG

## 2018-06-11 DIAGNOSIS — Z01.818 PREOP TESTING: ICD-10-CM

## 2018-06-11 DIAGNOSIS — I87.2 VENOUS INSUFFICIENCY: ICD-10-CM

## 2018-06-11 LAB
ABO GROUP BLD: NORMAL
BLD GP AB SCN SERPL QL: NEGATIVE
GLUCOSE BLDC GLUCOMTR-MCNC: 114 MG/DL (ref 70–130)
RH BLD: POSITIVE
T&S EXPIRATION DATE: NORMAL

## 2018-06-11 PROCEDURE — 25010000002 PROPOFOL 10 MG/ML EMULSION: Performed by: NURSE ANESTHETIST, CERTIFIED REGISTERED

## 2018-06-11 PROCEDURE — 86901 BLOOD TYPING SEROLOGIC RH(D): CPT | Performed by: NURSE PRACTITIONER

## 2018-06-11 PROCEDURE — 76937 US GUIDE VASCULAR ACCESS: CPT

## 2018-06-11 PROCEDURE — 86850 RBC ANTIBODY SCREEN: CPT | Performed by: NURSE PRACTITIONER

## 2018-06-11 PROCEDURE — C1894 INTRO/SHEATH, NON-LASER: HCPCS | Performed by: SURGERY

## 2018-06-11 PROCEDURE — 25010000002 MIDAZOLAM PER 1 MG: Performed by: ANESTHESIOLOGY

## 2018-06-11 PROCEDURE — 25010000002 PROPOFOL 1000 MG/ML EMULSION: Performed by: NURSE ANESTHETIST, CERTIFIED REGISTERED

## 2018-06-11 PROCEDURE — 25010000002 FENTANYL CITRATE (PF) 100 MCG/2ML SOLUTION: Performed by: NURSE ANESTHETIST, CERTIFIED REGISTERED

## 2018-06-11 PROCEDURE — 25010000002 MIDAZOLAM PER 1 MG: Performed by: NURSE ANESTHETIST, CERTIFIED REGISTERED

## 2018-06-11 PROCEDURE — C1888 ENDOVAS NON-CARDIAC ABL CATH: HCPCS | Performed by: SURGERY

## 2018-06-11 PROCEDURE — 86900 BLOOD TYPING SEROLOGIC ABO: CPT | Performed by: NURSE PRACTITIONER

## 2018-06-11 PROCEDURE — 36475 ENDOVENOUS RF 1ST VEIN: CPT | Performed by: SURGERY

## 2018-06-11 PROCEDURE — 82962 GLUCOSE BLOOD TEST: CPT

## 2018-06-11 RX ORDER — HYDROCODONE BITARTRATE AND ACETAMINOPHEN 5; 325 MG/1; MG/1
1-2 TABLET ORAL EVERY 6 HOURS PRN
Qty: 10 TABLET | Refills: 0 | Status: SHIPPED | OUTPATIENT
Start: 2018-06-11 | End: 2018-06-18

## 2018-06-11 RX ORDER — SODIUM CHLORIDE 9 MG/ML
INJECTION, SOLUTION INTRAVENOUS AS NEEDED
Status: DISCONTINUED | OUTPATIENT
Start: 2018-06-11 | End: 2018-06-11 | Stop reason: HOSPADM

## 2018-06-11 RX ORDER — HYDROCODONE BITARTRATE AND ACETAMINOPHEN 5; 325 MG/1; MG/1
1 TABLET ORAL ONCE AS NEEDED
Status: DISCONTINUED | OUTPATIENT
Start: 2018-06-11 | End: 2018-06-11 | Stop reason: HOSPADM

## 2018-06-11 RX ORDER — ONDANSETRON 2 MG/ML
4 INJECTION INTRAMUSCULAR; INTRAVENOUS ONCE AS NEEDED
Status: DISCONTINUED | OUTPATIENT
Start: 2018-06-11 | End: 2018-06-11 | Stop reason: HOSPADM

## 2018-06-11 RX ORDER — IPRATROPIUM BROMIDE AND ALBUTEROL SULFATE 2.5; .5 MG/3ML; MG/3ML
3 SOLUTION RESPIRATORY (INHALATION) ONCE AS NEEDED
Status: DISCONTINUED | OUTPATIENT
Start: 2018-06-11 | End: 2018-06-11 | Stop reason: HOSPADM

## 2018-06-11 RX ORDER — SODIUM CHLORIDE 0.9 % (FLUSH) 0.9 %
3 SYRINGE (ML) INJECTION AS NEEDED
Status: DISCONTINUED | OUTPATIENT
Start: 2018-06-11 | End: 2018-06-11 | Stop reason: HOSPADM

## 2018-06-11 RX ORDER — NALOXONE HCL 0.4 MG/ML
0.4 VIAL (ML) INJECTION AS NEEDED
Status: DISCONTINUED | OUTPATIENT
Start: 2018-06-11 | End: 2018-06-11 | Stop reason: HOSPADM

## 2018-06-11 RX ORDER — SODIUM CHLORIDE, SODIUM LACTATE, POTASSIUM CHLORIDE, CALCIUM CHLORIDE 600; 310; 30; 20 MG/100ML; MG/100ML; MG/100ML; MG/100ML
1000 INJECTION, SOLUTION INTRAVENOUS CONTINUOUS
Status: DISCONTINUED | OUTPATIENT
Start: 2018-06-11 | End: 2018-06-11 | Stop reason: HOSPADM

## 2018-06-11 RX ORDER — KETAMINE HYDROCHLORIDE 50 MG/ML
INJECTION, SOLUTION, CONCENTRATE INTRAMUSCULAR; INTRAVENOUS AS NEEDED
Status: DISCONTINUED | OUTPATIENT
Start: 2018-06-11 | End: 2018-06-11 | Stop reason: SURG

## 2018-06-11 RX ORDER — METOCLOPRAMIDE HYDROCHLORIDE 5 MG/ML
5 INJECTION INTRAMUSCULAR; INTRAVENOUS
Status: DISCONTINUED | OUTPATIENT
Start: 2018-06-11 | End: 2018-06-11 | Stop reason: HOSPADM

## 2018-06-11 RX ORDER — MEPERIDINE HYDROCHLORIDE 50 MG/ML
12.5 INJECTION INTRAMUSCULAR; INTRAVENOUS; SUBCUTANEOUS
Status: DISCONTINUED | OUTPATIENT
Start: 2018-06-11 | End: 2018-06-11 | Stop reason: HOSPADM

## 2018-06-11 RX ORDER — ACETAMINOPHEN 500 MG
1000 TABLET ORAL ONCE
Status: COMPLETED | OUTPATIENT
Start: 2018-06-11 | End: 2018-06-11

## 2018-06-11 RX ORDER — SODIUM CHLORIDE, SODIUM LACTATE, POTASSIUM CHLORIDE, CALCIUM CHLORIDE 600; 310; 30; 20 MG/100ML; MG/100ML; MG/100ML; MG/100ML
100 INJECTION, SOLUTION INTRAVENOUS CONTINUOUS
Status: DISCONTINUED | OUTPATIENT
Start: 2018-06-11 | End: 2018-06-11 | Stop reason: HOSPADM

## 2018-06-11 RX ORDER — LABETALOL HYDROCHLORIDE 5 MG/ML
5 INJECTION, SOLUTION INTRAVENOUS
Status: DISCONTINUED | OUTPATIENT
Start: 2018-06-11 | End: 2018-06-11 | Stop reason: HOSPADM

## 2018-06-11 RX ORDER — OXYCODONE AND ACETAMINOPHEN 10; 325 MG/1; MG/1
1 TABLET ORAL ONCE AS NEEDED
Status: DISCONTINUED | OUTPATIENT
Start: 2018-06-11 | End: 2018-06-11 | Stop reason: HOSPADM

## 2018-06-11 RX ORDER — MIDAZOLAM HYDROCHLORIDE 1 MG/ML
2 INJECTION INTRAMUSCULAR; INTRAVENOUS
Status: DISCONTINUED | OUTPATIENT
Start: 2018-06-11 | End: 2018-06-11 | Stop reason: HOSPADM

## 2018-06-11 RX ORDER — FENTANYL CITRATE 50 UG/ML
INJECTION, SOLUTION INTRAMUSCULAR; INTRAVENOUS AS NEEDED
Status: DISCONTINUED | OUTPATIENT
Start: 2018-06-11 | End: 2018-06-11 | Stop reason: SURG

## 2018-06-11 RX ORDER — PROPOFOL 10 MG/ML
VIAL (ML) INTRAVENOUS AS NEEDED
Status: DISCONTINUED | OUTPATIENT
Start: 2018-06-11 | End: 2018-06-11 | Stop reason: SURG

## 2018-06-11 RX ORDER — FENTANYL CITRATE 50 UG/ML
25 INJECTION, SOLUTION INTRAMUSCULAR; INTRAVENOUS AS NEEDED
Status: DISCONTINUED | OUTPATIENT
Start: 2018-06-11 | End: 2018-06-11 | Stop reason: HOSPADM

## 2018-06-11 RX ORDER — MIDAZOLAM HYDROCHLORIDE 1 MG/ML
INJECTION INTRAMUSCULAR; INTRAVENOUS AS NEEDED
Status: DISCONTINUED | OUTPATIENT
Start: 2018-06-11 | End: 2018-06-11 | Stop reason: SURG

## 2018-06-11 RX ORDER — SODIUM CHLORIDE 0.9 % (FLUSH) 0.9 %
1-10 SYRINGE (ML) INJECTION AS NEEDED
Status: DISCONTINUED | OUTPATIENT
Start: 2018-06-11 | End: 2018-06-11 | Stop reason: HOSPADM

## 2018-06-11 RX ORDER — MIDAZOLAM HYDROCHLORIDE 1 MG/ML
1 INJECTION INTRAMUSCULAR; INTRAVENOUS
Status: DISCONTINUED | OUTPATIENT
Start: 2018-06-11 | End: 2018-06-11 | Stop reason: HOSPADM

## 2018-06-11 RX ORDER — IBUPROFEN 600 MG/1
600 TABLET ORAL ONCE AS NEEDED
Status: DISCONTINUED | OUTPATIENT
Start: 2018-06-11 | End: 2018-06-11 | Stop reason: HOSPADM

## 2018-06-11 RX ORDER — OXYCODONE AND ACETAMINOPHEN 7.5; 325 MG/1; MG/1
2 TABLET ORAL ONCE AS NEEDED
Status: DISCONTINUED | OUTPATIENT
Start: 2018-06-11 | End: 2018-06-11 | Stop reason: HOSPADM

## 2018-06-11 RX ADMIN — KETAMINE HYDROCHLORIDE 12.5 MG: 50 INJECTION, SOLUTION INTRAMUSCULAR; INTRAVENOUS at 09:15

## 2018-06-11 RX ADMIN — SODIUM CHLORIDE, POTASSIUM CHLORIDE, SODIUM LACTATE AND CALCIUM CHLORIDE 1000 ML: 600; 310; 30; 20 INJECTION, SOLUTION INTRAVENOUS at 07:06

## 2018-06-11 RX ADMIN — FENTANYL CITRATE 25 MCG: 50 INJECTION, SOLUTION INTRAMUSCULAR; INTRAVENOUS at 09:11

## 2018-06-11 RX ADMIN — PROPOFOL 20 MG: 10 INJECTION, EMULSION INTRAVENOUS at 09:11

## 2018-06-11 RX ADMIN — KETAMINE HYDROCHLORIDE 12.5 MG: 50 INJECTION, SOLUTION INTRAMUSCULAR; INTRAVENOUS at 09:09

## 2018-06-11 RX ADMIN — MIDAZOLAM HYDROCHLORIDE 2 MG: 1 INJECTION, SOLUTION INTRAMUSCULAR; INTRAVENOUS at 08:27

## 2018-06-11 RX ADMIN — Medication 2 G: at 09:09

## 2018-06-11 RX ADMIN — ACETAMINOPHEN 1000 MG: 500 TABLET, FILM COATED ORAL at 08:27

## 2018-06-11 RX ADMIN — LIDOCAINE HYDROCHLORIDE 0.5 ML: 10 INJECTION, SOLUTION EPIDURAL; INFILTRATION; INTRACAUDAL; PERINEURAL at 07:06

## 2018-06-11 RX ADMIN — MIDAZOLAM HYDROCHLORIDE 1 MG: 1 INJECTION, SOLUTION INTRAMUSCULAR; INTRAVENOUS at 09:06

## 2018-06-11 RX ADMIN — FENTANYL CITRATE 25 MCG: 50 INJECTION, SOLUTION INTRAMUSCULAR; INTRAVENOUS at 09:06

## 2018-06-11 RX ADMIN — PROPOFOL 30 MG: 10 INJECTION, EMULSION INTRAVENOUS at 09:09

## 2018-06-11 RX ADMIN — PROPOFOL 75 MCG/KG/MIN: 10 INJECTION, EMULSION INTRAVENOUS at 09:04

## 2018-06-11 NOTE — ANESTHESIA PREPROCEDURE EVALUATION
Anesthesia Evaluation     Patient summary reviewed   no history of anesthetic complications:  NPO Solid Status: > 8 hours  NPO Liquid Status: > 8 hours           Airway   Mallampati: II  TM distance: >3 FB  Neck ROM: full  No difficulty expected  Dental - normal exam     Pulmonary    (+) sleep apnea (goes for CPAP fitting tomorrow),   (-) asthma, not a smoker  Cardiovascular   Exercise tolerance: (Limited by shortness of breath, denies chest pain)    (+) hypertension, PVD,   (-) angina      Neuro/Psych  (-) seizures, TIA, CVA  GI/Hepatic/Renal/Endo    (+) morbid obesity, GERD well controlled,  diabetes mellitus,   (-) liver disease, no renal disease    Musculoskeletal     Abdominal    Substance History      OB/GYN          Other                        Anesthesia Plan    ASA 3     MAC     intravenous induction   Anesthetic plan and risks discussed with patient.

## 2018-06-11 NOTE — H&P (VIEW-ONLY)
2018         Yohan Lockhart MD  [address] [place] [address]     Nia Barker  1955          Chief Complaint   Patient presents with   • Follow-up       Follow up for VVI results.         Dear Yohan Lockhart MD:        HPI  I had the pleasure of seeing your patient Nia Barker in the office today. As you recall, Nia Barker is a 62 y.o.  female who you are currently following for routine health maintenance.  She reports having varicose veins to her right leg.  Recently, she was shaving and hit one of these varicosities posterior thigh and could not get it to stop bleeding for some time.  She reports her feet being cold.  She has a history of bulging disks.  She denies any history of DVT.  She states she can not tolerate compression stockings.  She also has swelling of her legs bilaterally.  She did have noninvasive testing performed today, which I did review in office.        Past Medical History:   Diagnosis Date   • Diabetes mellitus    • Disease of thyroid gland    • Hypertension    • Indigestion    • Morbid obesity    • Varicose veins of leg with pain      Past Surgical History:   Procedure Laterality Date   •  SECTION     • DILATION AND CURETTAGE, DIAGNOSTIC / THERAPEUTIC  2004    emergency   • EYE SURGERY     • GALLBLADDER SURGERY      removal   • SKIN LESION EXCISION  01/2016    x2   • TUBAL ABDOMINAL LIGATION       Family History   Problem Relation Age of Onset   • Diabetes Mother    • Arthritis Mother    • Stroke Mother    • Osteoporosis Mother    • Hypertension Mother    • Hypertension Father    • Diabetes Father    • Arthritis Father    • Stroke Father    • Cancer Father      Social History   Substance Use Topics   • Smoking status: Former Smoker     Quit date:    • Smokeless tobacco: Never Used   • Alcohol use Yes      Comment: ocassional     No Known Allergies    Current Outpatient Prescriptions:   •  amLODIPine (NORVASC) 5 MG tablet, Take 5 mg  by mouth Daily. Take 1 tablet orally once a day, Disp: , Rfl:   •  B Complex Vitamins (VITAMIN B COMPLEX) capsule capsule, Take  by mouth Daily., Disp: , Rfl:   •  Conj Estrogens-Bazedoxifene (DUAVEE) 0.45-20 MG tablet, take 1 tablet by mouth  daily, Disp: , Rfl:   •  fluconazole (DIFLUCAN) 100 MG tablet, Take 100 mg by mouth Daily. Take 1 Tablet orally every two weeks, Disp: , Rfl:   •  FLUoxetine (PROzac) 20 MG capsule, Take 20 mg by mouth Daily. Take 1 capsule in the morning orally once a day, Disp: , Rfl:   •  GLUCOSAMINE-CHONDROITIN PO, Take  by mouth Daily., Disp: , Rfl:   •  levothyroxine (SYNTHROID, LEVOTHROID) 175 MCG tablet, Take 175 mcg by mouth Daily. Take 1 tablet on an empty stomach in the morning orally once a day, Disp: , Rfl:   •  losartan-hydrochlorothiazide (HYZAAR) 100-25 MG per tablet, Take 1 tablet by mouth Daily. Take 1 table orally once a day, Disp: , Rfl:   •  Magnesium Hydroxide (MAGNESIA PO), Take  by mouth., Disp: , Rfl:   •  meloxicam (MOBIC) 15 MG tablet, Take 15 mg by mouth Daily. Take 1 tablet orally once a day, Disp: , Rfl:   •  METFORMIN HCL PO, Take 500 mg by mouth 2 (Two) Times a Day. Take 1 tablet by mouth twice a day with meals, Disp: , Rfl:   •  metoprolol succinate XL (TOPROL XL) 25 MG 24 hr tablet, Take 25 mg by mouth Daily. Take 1 tablet orally once a day prn for heart palpitations., Disp: , Rfl:   •  MULTIPLE VITAMIN PO, Take  by mouth Daily., Disp: , Rfl:   •  omeprazole (priLOSEC) 40 MG capsule, Take 40 mg by mouth As Needed. Take 1 capsule by mouth once a day , Disp: , Rfl:   •  Zinc 25 MG tablet, Take  by mouth Daily., Disp: , Rfl:        Review of Systems   Constitutional: Negative.    HENT: Negative.    Eyes: Negative.    Respiratory: Negative.    Cardiovascular: Positive for leg swelling.        Varicose veins   Gastrointestinal: Negative.    Endocrine: Negative.    Genitourinary: Negative.    Musculoskeletal: Negative.    Skin: Negative.    Allergic/Immunologic:  "Negative.    Neurological: Negative.    Hematological: Negative.    Psychiatric/Behavioral: Negative.          /72   Pulse 78   Ht 157.5 cm (62\")   Wt 109 kg (241 lb)   BMI 44.08 kg/m²   Physical Exam   Constitutional: She is oriented to person, place, and time. She appears well-developed and well-nourished. No distress.   obese   HENT:   Head: Normocephalic and atraumatic.   Mouth/Throat: Oropharynx is clear and moist.   Eyes: Pupils are equal, round, and reactive to light. No scleral icterus.   Neck: Normal range of motion. Neck supple. No JVD present. Carotid bruit is not present. No thyromegaly present.   Cardiovascular: Normal rate, regular rhythm, S2 normal, normal heart sounds, intact distal pulses and normal pulses.  Exam reveals no gallop and no friction rub.    No murmur heard.  Varicose veins bilaterally   Pulmonary/Chest: Effort normal and breath sounds normal.   Abdominal: Soft. Normal aorta and bowel sounds are normal. There is no hepatosplenomegaly.   obese   Musculoskeletal: Normal range of motion. She exhibits edema (BLE).   Neurological: She is alert and oriented to person, place, and time. No cranial nerve deficit.   Skin: Skin is warm and dry. She is not diaphoretic.   Psychiatric: She has a normal mood and affect. Her behavior is normal. Judgment and thought content normal.   Nursing note and vitals reviewed.        Us Venous Doppler Lower Extremity Bilateral (duplex)     Result Date: 5/15/2018  Narrative: History: Swelling   Comments: Venous valvular insufficiency testing was performed in both legs using duplex ultrasound with rapid cuff deflation technique.  The common femoral veins, popliteal veins, posterior tibial veins, greater saphenous veins, and lesser saphenous veins were interrogated bilaterally.  On the right, the greater saphenous vein at the junction measured 7.9 mm. In the mid thigh measured 4 mm. Above the knee measured 5.5 mm. In the mid calf measured 3 mm. At the " ankle measured 3.1 mm. There is greater than 0.5 seconds of reflux from the junction down to the distal thigh. There is also a  in the mid calf measuring 4.3 mm with greater than 0.5 seconds of reflux. The lesser saphenous vein is within normal limits and no evidence of reflux. There is no evidence of DVT.  On the left, the greater saphenous vein at the junction measured 7.4 mm. In the mid thigh measured 2.8 mm. Above the knee measured 4.7 mm. In the mid calf measured 2.9 mm. At the ankle measured 3.2 mm. There is greater than 0.5 seconds of reflux at the junction and from the mid calf to the ankle. The lesser saphenous vein is within normal limits and no evidence of reflux. There is no evidence of DVT.       Impression: Impression: There is evidence of venous insufficiency in both lower extremity greater saphenous veins. There is also a  in the right mid calf.    This report was finalized on 05/15/2018 16:41 by Dr. Melchor Jacob MD.           Patient Active Problem List   Diagnosis   • Varicose veins of leg with pain   • Morbid obesity   • Hypertension   • Diabetes mellitus             ICD-10-CM ICD-9-CM   1. Venous insufficiency I87.2 459.81   2. Varicose veins of both lower extremities with pain I83.813 454.8   3. Essential hypertension I10 401.9   4. Preop testing Z01.818 V72.84            Plan: After thoroughly evaluating Nia Barker, I believe the best course of action is to proceed with a right lower extremity saphenous vein closure using radiofrequency ablation. Insurance has denied Venaseal closure.  Risks of radiofrequency ablation include, but are not limited to, bleeding, infection, vessel damage, nerve damage, DVT, phlebitis, and pulmonary embolus.  The patient understands these risks and wishes to proceed with procedure.  I will give the patient a prescription for compression stockings in the 20-30 mm pressure gradient range.  I did instruct the patient on how to wear these  on a daily basis.   I did discuss vascular risk factors as they pertain to the progression of vascular disease including controlling her hypertension and diabetes mellitus.  Body mass index is 44.08 kg/m².  Follow up with PCP regarding abnormal BMI.The patient can continue taking her current medication regimen as previously planned.  This was all discussed in full with complete understanding.     Thank you for allowing me to participate in the care of your patient.  Please do not hesitate with any questions or concerns.  I will keep you aware of any further encounters with Nia Barker.           Sincerely yours,           RICARDO Salas MD

## 2018-06-11 NOTE — DISCHARGE INSTRUCTIONS
YOUR NEXT PAIN MEDICATION IS DUE AT______________        Moderate Conscious Sedation, Adult, Care After  Refer to this sheet in the next few weeks. These instructions provide you with information on caring for yourself after your procedure. Your health care provider may also give you more specific instructions. Your treatment has been planned according to current medical practices, but problems sometimes occur. Call your health care provider if you have any problems or questions after your procedure.  WHAT TO EXPECT AFTER THE PROCEDURE    After your procedure:  · You may feel sleepy, clumsy, and have poor balance for several hours.  · Vomiting may occur if you eat too soon after the procedure.  HOME CARE INSTRUCTIONS  · Do not participate in any activities where you could become injured for at least 24 hours. Do not:  ¨ Drive.  ¨ Swim.  ¨ Ride a bicycle.  ¨ Operate heavy machinery.  ¨ Cook.  ¨ Use power tools.  ¨ Climb ladders.  ¨ Work from a high place.  · Do not make important decisions or sign legal documents until you are improved.  · If you vomit, drink water, juice, or soup when you can drink without vomiting. Make sure you have little or no nausea before eating solid foods.  · Only take over-the-counter or prescription medicines for pain, discomfort, or fever as directed by your health care provider.  · Make sure you and your family fully understand everything about the medicines given to you, including what side effects may occur.  · You should not drink alcohol, take sleeping pills, or take medicines that cause drowsiness for at least 24 hours.  · If you smoke, do not smoke without supervision.  · If you are feeling better, you may resume normal activities 24 hours after you were sedated.  · Keep all appointments with your health care provider.  SEEK MEDICAL CARE IF:  · Your skin is pale or bluish in color.  · You continue to feel nauseous or vomit.  · Your pain is getting worse and is not helped by  medicine.  · You have bleeding or swelling.  · You are still sleepy or feeling clumsy after 24 hours.  SEEK IMMEDIATE MEDICAL CARE IF:  · You develop a rash.  · You have difficulty breathing.  · You develop any type of allergic problem.  · You have a fever.  MAKE SURE YOU:  · Understand these instructions.  · Will watch your condition.  · Will get help right away if you are not doing well or get worse.     This information is not intended to replace advice given to you by your health care provider. Make sure you discuss any questions you have with your health care provider.     Document Released: 10/08/2014 Document Revised: 01/08/2016 Document Reviewed: 10/08/2014  [employer] Interactive Patient Education ©2016 [employer]         CALL YOUR PHYSICIAN IF YOU EXPERIENCE  INCREASED PAIN NOT HELPED BY YOUR PAIN MEDICATION.        Fall Prevention in the Home      Falls can cause injuries. They can happen to people of all ages. There are many things you can do to make your home safe and to help prevent falls.    WHAT CAN I DO ON THE OUTSIDE OF MY HOME?  · Regularly fix the edges of walkways and driveways and fix any cracks.  · Remove anything that might make you trip as you walk through a door, such as a raised step or threshold.  · Trim any bushes or trees on the path to your home.  · Use bright outdoor lighting.  · Clear any walking paths of anything that might make someone trip, such as rocks or tools.  · Regularly check to see if handrails are loose or broken. Make sure that both sides of any steps have handrails.  · Any raised decks and porches should have guardrails on the edges.  · Have any leaves, snow, or ice cleared regularly.  · Use sand or salt on walking paths during winter.  · Clean up any spills in your garage right away. This includes oil or grease spills.  WHAT CAN I DO IN THE BATHROOM?    · Use night lights.  · Install grab bars by the toilet and in the tub and shower. Do not use towel bars as grab  bars.  · Use non-skid mats or decals in the tub or shower.  · If you need to sit down in the shower, use a plastic, non-slip stool.  · Keep the floor dry. Clean up any water that spills on the floor as soon as it happens.  · Remove soap buildup in the tub or shower regularly.  · Attach bath mats securely with double-sided non-slip rug tape.  · Do not have throw rugs and other things on the floor that can make you trip.  WHAT CAN I DO IN THE BEDROOM?  · Use night lights.  · Make sure that you have a light by your bed that is easy to reach.  · Do not use any sheets or blankets that are too big for your bed. They should not hang down onto the floor.  · Have a firm chair that has side arms. You can use this for support while you get dressed.  · Do not have throw rugs and other things on the floor that can make you trip.  WHAT CAN I DO IN THE KITCHEN?  · Clean up any spills right away.  · Avoid walking on wet floors.  · Keep items that you use a lot in easy-to-reach places.  · If you need to reach something above you, use a strong step stool that has a grab bar.  · Keep electrical cords out of the way.  · Do not use floor polish or wax that makes floors slippery. If you must use wax, use non-skid floor wax.  · Do not have throw rugs and other things on the floor that can make you trip.  WHAT CAN I DO WITH MY STAIRS?  · Do not leave any items on the stairs.  · Make sure that there are handrails on both sides of the stairs and use them. Fix handrails that are broken or loose. Make sure that handrails are as long as the stairways.  · Check any carpeting to make sure that it is firmly attached to the stairs. Fix any carpet that is loose or worn.  · Avoid having throw rugs at the top or bottom of the stairs. If you do have throw rugs, attach them to the floor with carpet tape.  · Make sure that you have a light switch at the top of the stairs and the bottom of the stairs. If you do not have them, ask someone to add them for  you.  WHAT ELSE CAN I DO TO HELP PREVENT FALLS?  · Wear shoes that:  ¨ Do not have high heels.  ¨ Have rubber bottoms.  ¨ Are comfortable and fit you well.  ¨ Are closed at the toe. Do not wear sandals.  · If you use a stepladder:  ¨ Make sure that it is fully opened. Do not climb a closed stepladder.  ¨ Make sure that both sides of the stepladder are locked into place.  ¨ Ask someone to hold it for you, if possible.  · Clearly jefferson and make sure that you can see:  ¨ Any grab bars or handrails.  ¨ First and last steps.  ¨ Where the edge of each step is.  · Use tools that help you move around (mobility aids) if they are needed. These include:  ¨ Canes.  ¨ Walkers.  ¨ Scooters.  ¨ Crutches.  · Turn on the lights when you go into a dark area. Replace any light bulbs as soon as they burn out.  · Set up your furniture so you have a clear path. Avoid moving your furniture around.  · If any of your floors are uneven, fix them.  · If there are any pets around you, be aware of where they are.  · Review your medicines with your doctor. Some medicines can make you feel dizzy. This can increase your chance of falling.  Ask your doctor what other things that you can do to help prevent falls.     This information is not intended to replace advice given to you by your health care provider. Make sure you discuss any questions you have with your health care provider.     Document Released: 10/14/2010 Document Revised: 05/03/2016 Document Reviewed: 01/22/2016  [employer]vier Interactive Patient Education ©2016 [employer]     PATIENT/FAMILY/RESPONSIBLE PARTY VERBALIZES UNDERSTANDING OF ABOVE EDUCATION.  COPY OF PAIN SCALE GIVEN AND REVIEWED WITH VERBALIZED UNDERSTANDING.

## 2018-06-11 NOTE — ANESTHESIA POSTPROCEDURE EVALUATION
Patient: Nia Barker    Procedure Summary     Date:  06/11/18 Room / Location:  [facility] OR  / [facility] HYBRID OR 12    Anesthesia Start:  0904 Anesthesia Stop:  0955    Procedure:  Radiofrequency ablation of the right lower extremity (Right Leg Lower) Diagnosis:       Venous insufficiency      Preop testing      (Venous insufficiency [I87.2])      (Preop testing [Z01.818])    Provider:  Melchor Jacob DO Provider:  Romy Michael CRNA    Anesthesia Type:  MAC ASA Status:  3          Anesthesia Type: MAC  Last vitals  BP   154/72 (06/11/18 1015)   Temp   98.4 °F (36.9 °C) (06/11/18 0954)   Pulse   63 (06/11/18 1015)   Resp   18 (06/11/18 1015)     SpO2   96 % (06/11/18 1015)     Post Anesthesia Care and Evaluation    Patient location during evaluation: PHASE II  Patient participation: complete - patient participated  Level of consciousness: awake and awake and alert  Pain score: 0  Pain management: adequate  Airway patency: patent  Anesthetic complications: No anesthetic complications  PONV Status: none  Cardiovascular status: acceptable  Respiratory status: acceptable  Hydration status: acceptable    Comments: Patient discharged according to acceptable Daria score per RN assessment. See nursing records for further information.     Blood pressure 154/72, pulse 63, temperature 98.4 °F (36.9 °C), temperature source Temporal Artery , resp. rate 18, SpO2 96 %.

## 2018-06-11 NOTE — OP NOTE
Nia Barker  6/11/2018     PREOPERATIVE DIAGNOSIS: Venous insufficiency [I87.2]  Preop testing [Z01.818]     POSTOPERATIVE DIAGNOSIS: Post-Op Diagnosis Codes:     * Venous insufficiency [I87.2]     * Preop testing [Z01.818]     PROCEDURE PERFORMED:   1.  Ultrasound guided cannulation of the right lower external greater saphenous vein  2.  Radio frequency ablation of the right lower extremity greater saphenous vein     SURGEON: Melchor Jacob DO      ANESTHESIA: Mac    PREPARATION: Routine.    STAFF: Circulator: Corinne Pandya RN  Scrub Person: Talia Reyes  Assistant: Echo Enriquez  Vascular Ultrasound Technician: Jus Mae    ESTIMATED BLOOD LOSS: 5 ML    SPECIMENS: None    COMPLICATIONS: None    INDICATIONS: Nia Barker is a 62 y.o. female who reports having varicose veins to her right leg.  Recently, she was shaving and hit one of these varicosities posterior thigh and could not get it to stop bleeding for some time.  She reports her feet being cold.  She has a history of bulging disks.  She denies any history of DVT.  She states she can not tolerate compression stockings.  She also has swelling of her legs bilaterally.  The indications, risks, and possible complications of the procedure were explained to the patient, who voiced understanding and wished to proceed with surgery.     PROCEDURE IN DETAIL: The patient was taken to the operating room and placed on the operating table in a supine position. After Mac anesthesia was obtained, the right lower extremity was prepped and draped in a sterile manner.  Under ultrasound guidance and using a micropuncture technique the right lower extremity greater saphenous vein was cannulated just below the knee.  The microwire was placed.  This was confirmed under ultrasound.  A 7 Algerian sheath was placed.  The patient was placed in Trendelenburg position and the saphenofemoral junction was identified under ultrasound.  The radio frequency  ablation catheter was placed through the sheath up to the junction and pulled back 3 cm and marked.  Next, tumescent fluid was instilled along the entire length of the vein under ultrasound guidance.  Once sufficient tumescent fluid was placed the radio frequency ablation had commenced.  There was a total of 7 RF cycles for a total treatment length of 39.5 cm for a total treatment time of 2 minutes and 20 seconds.  There was an average of 11 W and an average temperature 119°C.  Upon completion of the ablation the catheter and sheath were removed and direct pressure was held for 5-10 minutes to help ensure hemostasis.  An Ace wrap was placed from the toes to the groin.  Sterile dressings were applied. The patient tolerated the procedure well. Sponge and needle counts were correct. The patient was then awakened and transferred to the outpatient center in stable condition.  Melchor Jacob,   Date: 6/11/2018 Time: 9:42 AM     CC:Yohan Lockhart MD

## 2018-06-11 NOTE — BRIEF OP NOTE
SAPHENOUS VEIN RADIO FREQUENCY ABLATION  Progress Note    Nia Barker  6/11/2018    Pre-op Diagnosis:   Venous insufficiency [I87.2]  Preop testing [Z01.818]       Post-Op Diagnosis Codes:     * Venous insufficiency [I87.2]     * Preop testing [Z01.818]    Procedure/CPT® Codes:      Procedure(s):  Radiofrequency ablation of the right lower extremity    Surgeon(s):  Melchor Jacob DO    Anesthesia: Monitor Anesthesia Care    Staff:   Circulator: Corinne Pandya RN  Scrub Person: Talia Reyes  Assistant: Echo Enriquez  Vascular Ultrasound Technician: Jus Mae    Estimated Blood Loss: 5ml    Urine Voided: * No values recorded between 6/11/2018  9:04 AM and 6/11/2018  9:38 AM *    Specimens:                None        Complications: none      Melchor Jacob DO     Date: 6/11/2018  Time: 9:38 AM

## 2018-06-13 ENCOUNTER — APPOINTMENT (OUTPATIENT)
Dept: PREADMISSION TESTING | Facility: HOSPITAL | Age: 63
End: 2018-06-13

## 2018-06-15 ENCOUNTER — TELEPHONE (OUTPATIENT)
Dept: VASCULAR SURGERY | Facility: CLINIC | Age: 63
End: 2018-06-15

## 2018-06-15 NOTE — TELEPHONE ENCOUNTER
Left message reminding Mrs Barker of her appointments for Monday, June 18th, 2018. Reminded [facility] and follow up afterwards at 11 am with Malini SILVA. Also advised if she had any questions or needed to reschedule to please call the office at [phone].

## 2018-06-18 ENCOUNTER — OFFICE VISIT (OUTPATIENT)
Dept: VASCULAR SURGERY | Facility: CLINIC | Age: 63
End: 2018-06-18

## 2018-06-18 ENCOUNTER — HOSPITAL ENCOUNTER (OUTPATIENT)
Dept: ULTRASOUND IMAGING | Facility: HOSPITAL | Age: 63
Discharge: HOME | End: 2018-06-18
Attending: SURGERY | Admitting: SURGERY

## 2018-06-18 VITALS
SYSTOLIC BLOOD PRESSURE: 126 MMHG | OXYGEN SATURATION: 98 % | HEART RATE: 76 BPM | DIASTOLIC BLOOD PRESSURE: 78 MMHG | BODY MASS INDEX: 43.06 KG/M2 | HEIGHT: 62 IN | WEIGHT: 234 LBS

## 2018-06-18 DIAGNOSIS — E66.01 MORBID OBESITY (HCC): ICD-10-CM

## 2018-06-18 DIAGNOSIS — I87.2 VENOUS INSUFFICIENCY: Primary | ICD-10-CM

## 2018-06-18 DIAGNOSIS — I83.813 VARICOSE VEINS OF BOTH LOWER EXTREMITIES WITH PAIN: ICD-10-CM

## 2018-06-18 DIAGNOSIS — I87.2 VENOUS INSUFFICIENCY: ICD-10-CM

## 2018-06-18 DIAGNOSIS — I10 ESSENTIAL HYPERTENSION: ICD-10-CM

## 2018-06-18 PROCEDURE — 93971 EXTREMITY STUDY: CPT

## 2018-06-18 PROCEDURE — 99213 OFFICE O/P EST LOW 20 MIN: CPT | Performed by: NURSE PRACTITIONER

## 2018-06-18 PROCEDURE — 93971 EXTREMITY STUDY: CPT | Performed by: SURGERY

## 2018-06-18 NOTE — PROGRESS NOTES
"06/18/2018       Yohan Lockhart MD    [address]  [place] [address]    Nia Barker  1955    Chief Complaint   Patient presents with   • Follow-up     1 Week Follow Up. Test 06/18/18 US pad venous lower ext right. Patient denies any stroke like symptoms.        Dear Yohan Lockhart MD       HPI  I had the pleasure of seeing your patient Nia Barker in the office today. As you recall, Nia Barker is a 62 y.o.  female who you are currently following for routine health maintenance.  She reports having varicose veins to her right leg.  Recently, she was shaving and hit one of these varicosities posterior thigh and could not get it to stop bleeding for some time.  She reports her feet being cold.  She has a history of bulging disks.  She denies any history of DVT.  She states she can not tolerate compression stockings.  She also has swelling of her legs bilaterally.  She did undergo radiofrequency ablation of the right lower extremity.  She does state she does not notice a significant difference.  She did have noninvasive testing performed today, which I did review in office.        Review of Systems   Constitutional: Negative.    HENT: Negative.    Eyes: Negative.    Respiratory: Negative.    Cardiovascular: Positive for leg swelling.        Varicose veins   Gastrointestinal: Negative.    Endocrine: Negative.    Genitourinary: Negative.    Musculoskeletal: Negative.    Skin: Negative.    Allergic/Immunologic: Negative.    Neurological: Negative.    Hematological: Negative.    Psychiatric/Behavioral: Negative.        /78 (BP Location: Right arm, Patient Position: Sitting, Cuff Size: Adult)   Pulse 76   Ht 157.5 cm (62\")   Wt 106 kg (234 lb)   SpO2 98%   BMI 42.80 kg/m²   Physical Exam   Constitutional: She is oriented to person, place, and time. She appears well-developed and well-nourished. No distress.   obese   HENT:   Head: Normocephalic and atraumatic.   Mouth/Throat: Oropharynx " is clear and moist.   Eyes: Pupils are equal, round, and reactive to light. No scleral icterus.   Neck: Normal range of motion. Neck supple. No JVD present. Carotid bruit is not present. No thyromegaly present.   Cardiovascular: Normal rate, regular rhythm, S2 normal, normal heart sounds, intact distal pulses and normal pulses.  Exam reveals no gallop and no friction rub.    No murmur heard.  Varicose veins bilaterally   Pulmonary/Chest: Effort normal and breath sounds normal.   Abdominal: Soft. Normal aorta and bowel sounds are normal. There is no hepatosplenomegaly.   obese   Musculoskeletal: Normal range of motion. She exhibits edema (BLE).   Neurological: She is alert and oriented to person, place, and time. No cranial nerve deficit.   Skin: Skin is warm and dry. She is not diaphoretic.   Psychiatric: She has a normal mood and affect. Her behavior is normal. Judgment and thought content normal.   Nursing note and vitals reviewed.      Xr Chest 2 Vw    Result Date: 6/8/2018  Narrative: EXAMINATION: XR CHEST 2 VW- 6/8/2018 2:01 PM CDT  HISTORY: preop-sleep apnea; I87.2-Venous insufficiency (chronic) (peripheral); Z01.818-Encounter for other preprocedural examination.  REPORT: There are no comparison studies.   Frontal and lateral views of the chest were obtained. The lungs are clear and normally expanded. The heart and mediastinum appear normal. There are calcified lymph nodes at the right hilum compatible with healed granulomatous disease. The bony thorax and upper abdomen are unremarkable.      Impression:  No active disease.     This report was finalized on 06/08/2018 14:02 by Dr. Yohan Rivera MD.    Us Guided Vascular Access    Result Date: 6/11/2018  Narrative: History: Venous insufficiency.  Comments: Grayscale imaging as well as color flow duplex were used to evaluate the right lower extremity greater saphenous vein during venous closure.  The greater saphenous vein was successfully cannulated just below  the knee. The catheter was placed up to the junction and pulled back 3 cm and marked.      Impression: Successful endovenous ablation of the right lower extremity greater saphenous vein. This report was finalized on 06/11/2018 11:48 by Dr. Melchor Jacob MD.    Us Venous Doppler Lower Extremity Right (duplex)    Result Date: 6/18/2018  Narrative: History: Swelling      Impression: Impression: 1. There is no evidence of deep venous thrombosis of the right lower extremity. 2. The greater saphenous vein is closed from zones 2 through 8.  Comments: Right lower extremity venous duplex exam was performed using color Doppler flow, Doppler wave form analysis, and grayscale imaging, with and without compression. There is no evidence of deep venous thrombosis of the common femoral, superficial femoral, popliteal, posterior tibial, and peroneal veins. There is no thrombus identified in the saphenofemoral junction. There is no evidence of clot in the left common femoral vein.  This report was finalized on 06/18/2018 18:12 by Dr. Melchor Jacob MD.    Patient Active Problem List   Diagnosis   • Varicose veins of leg with pain   • Morbid obesity   • Hypertension   • Diabetes mellitus   • Venous insufficiency         ICD-10-CM ICD-9-CM   1. Venous insufficiency I87.2 459.81   2. Varicose veins of both lower extremities with pain I83.813 454.8   3. Essential hypertension I10 401.9   4. Morbid obesity E66.01 278.01         Plan: After thoroughly evaluating Nia Barker, I believe the best course of action is to Remain conservative from vascular standpoint.  Her testing is normal status post radiofrequency ablation with no evidence of DVT.  She has no difficulties since radiofrequency ablation however does not notice any significant change either.  I will give the patient a prescription for compression stockings in the 20-30 mm pressure gradient range.  I did instruct the patient on how to wear these on a daily basis, however  has been noncompliant with this.  She is interested in sclerotherapy, which we do not do in our office at this time.  She would like to hold on any intervention of her left leg.  We will see her back in 6 months for continued follow-up.  I did discuss vascular risk factors as they pertain to the progression of vascular disease including controlling her hypertension and diabetes mellitus.  Body mass index is 42.8 kg/m².  Follow up with PCP regarding abnormal BMI.The patient can continue taking her current medication regimen as previously planned.  This was all discussed in full with complete understanding.    Thank you for allowing me to participate in the care of your patient.  Please do not hesitate with any questions or concerns.  I will keep you aware of any further encounters with Nia Barker.        Sincerely yours,         RICARDO Salas MD

## 2018-06-19 PROBLEM — Z01.818 PREOP TESTING: Status: RESOLVED | Noted: 2018-05-22 | Resolved: 2018-06-19

## 2018-06-22 ENCOUNTER — TELEPHONE (OUTPATIENT)
Dept: VASCULAR SURGERY | Facility: CLINIC | Age: 63
End: 2018-06-22

## 2018-06-22 NOTE — TELEPHONE ENCOUNTER
Patient called requesting diflucan prescription to [facility].Complaint of yeast infection from post procedure antibiotics.Discussed with Malini.Patient to contact PCP regarding diflucan.Voiced understanding.

## 2018-08-21 ENCOUNTER — TRANSCRIBE ORDERS (OUTPATIENT)
Dept: ADMINISTRATIVE | Facility: HOSPITAL | Age: 63
End: 2018-08-21

## 2018-08-21 DIAGNOSIS — I48.91 ATRIAL FIBRILLATION, UNSPECIFIED TYPE (HCC): Primary | ICD-10-CM

## 2018-08-22 ENCOUNTER — HOSPITAL ENCOUNTER (OUTPATIENT)
Dept: CARDIOLOGY | Facility: HOSPITAL | Age: 63
Discharge: HOME | End: 2018-08-22
Attending: FAMILY MEDICINE | Admitting: FAMILY MEDICINE

## 2018-08-22 VITALS
BODY MASS INDEX: 43.98 KG/M2 | HEIGHT: 62 IN | WEIGHT: 239 LBS | DIASTOLIC BLOOD PRESSURE: 78 MMHG | SYSTOLIC BLOOD PRESSURE: 126 MMHG

## 2018-08-22 DIAGNOSIS — I48.91 ATRIAL FIBRILLATION, UNSPECIFIED TYPE (HCC): ICD-10-CM

## 2018-08-22 PROCEDURE — 25010000002 PERFLUTREN 6.52 MG/ML SUSPENSION: Performed by: FAMILY MEDICINE

## 2018-08-22 PROCEDURE — 93306 TTE W/DOPPLER COMPLETE: CPT

## 2018-08-22 PROCEDURE — 93306 TTE W/DOPPLER COMPLETE: CPT | Performed by: INTERNAL MEDICINE

## 2018-08-22 RX ADMIN — PERFLUTREN 8.48 MG: 6.52 INJECTION, SUSPENSION INTRAVENOUS at 14:58

## 2018-08-23 LAB
BH CV ECHO MEAS - AO MAX PG (FULL): 1.1 MMHG
BH CV ECHO MEAS - AO MAX PG: 8.2 MMHG
BH CV ECHO MEAS - AO MEAN PG (FULL): 0 MMHG
BH CV ECHO MEAS - AO MEAN PG: 5 MMHG
BH CV ECHO MEAS - AO ROOT AREA (BSA CORRECTED): 1.4
BH CV ECHO MEAS - AO ROOT AREA: 7.1 CM^2
BH CV ECHO MEAS - AO ROOT DIAM: 3 CM
BH CV ECHO MEAS - AO V2 MAX: 143 CM/SEC
BH CV ECHO MEAS - AO V2 MEAN: 108 CM/SEC
BH CV ECHO MEAS - AO V2 VTI: 31.1 CM
BH CV ECHO MEAS - AVA(I,A): 3.5 CM^2
BH CV ECHO MEAS - AVA(I,D): 3.5 CM^2
BH CV ECHO MEAS - AVA(V,A): 3.2 CM^2
BH CV ECHO MEAS - AVA(V,D): 3.2 CM^2
BH CV ECHO MEAS - BSA(HAYCOCK): 2.3 M^2
BH CV ECHO MEAS - BSA: 2.2 M^2
BH CV ECHO MEAS - BZI_BMI: 36.6 KILOGRAMS/M^2
BH CV ECHO MEAS - BZI_METRIC_HEIGHT: 170.2 CM
BH CV ECHO MEAS - BZI_METRIC_WEIGHT: 106.1 KG
BH CV ECHO MEAS - EDV(CUBED): 54 ML
BH CV ECHO MEAS - EDV(MOD-SP4): 74.6 ML
BH CV ECHO MEAS - EDV(TEICH): 61.2 ML
BH CV ECHO MEAS - EF(MOD-SP4): 66 %
BH CV ECHO MEAS - ESV(MOD-SP4): 25.4 ML
BH CV ECHO MEAS - IVS/LVPW: 1.1
BH CV ECHO MEAS - IVSD: 1.5 CM
BH CV ECHO MEAS - LA DIMENSION: 3.7 CM
BH CV ECHO MEAS - LA/AO: 1.2
BH CV ECHO MEAS - LV DIASTOLIC VOL/BSA (35-75): 34.5 ML/M^2
BH CV ECHO MEAS - LV MASS(C)D: 195.9 GRAMS
BH CV ECHO MEAS - LV MASS(C)DI: 90.6 GRAMS/M^2
BH CV ECHO MEAS - LV MAX PG: 7.1 MMHG
BH CV ECHO MEAS - LV MEAN PG: 5 MMHG
BH CV ECHO MEAS - LV SYSTOLIC VOL/BSA (12-30): 11.8 ML/M^2
BH CV ECHO MEAS - LV V1 MAX: 133 CM/SEC
BH CV ECHO MEAS - LV V1 MEAN: 99.8 CM/SEC
BH CV ECHO MEAS - LV V1 VTI: 31 CM
BH CV ECHO MEAS - LVIDD: 3.8 CM
BH CV ECHO MEAS - LVLD AP4: 6.6 CM
BH CV ECHO MEAS - LVLS AP4: 5.6 CM
BH CV ECHO MEAS - LVOT AREA (M): 3.5 CM^2
BH CV ECHO MEAS - LVOT AREA: 3.5 CM^2
BH CV ECHO MEAS - LVOT DIAM: 2.1 CM
BH CV ECHO MEAS - LVPWD: 1.4 CM
BH CV ECHO MEAS - MV DEC TIME: 0.26 SEC
BH CV ECHO MEAS - MV E MAX VEL: 129 CM/SEC
BH CV ECHO MEAS - RAP SYSTOLE: 10 MMHG
BH CV ECHO MEAS - RVSP: 26.3 MMHG
BH CV ECHO MEAS - SI(AO): 101.7 ML/M^2
BH CV ECHO MEAS - SI(LVOT): 49.7 ML/M^2
BH CV ECHO MEAS - SI(MOD-SP4): 22.8 ML/M^2
BH CV ECHO MEAS - SV(AO): 219.8 ML
BH CV ECHO MEAS - SV(LVOT): 107.4 ML
BH CV ECHO MEAS - SV(MOD-SP4): 49.2 ML
BH CV ECHO MEAS - TR MAX VEL: 202 CM/SEC
MAXIMAL PREDICTED HEART RATE: 156 BPM
STRESS TARGET HR: 133 BPM

## 2018-08-27 ENCOUNTER — HOSPITAL ENCOUNTER (INPATIENT)
Facility: HOSPITAL | Age: 63
LOS: 2 days | Discharge: HOME | End: 2018-08-29
Attending: EMERGENCY MEDICINE | Admitting: FAMILY MEDICINE

## 2018-08-27 ENCOUNTER — APPOINTMENT (OUTPATIENT)
Dept: GENERAL RADIOLOGY | Facility: HOSPITAL | Age: 63
End: 2018-08-27

## 2018-08-27 DIAGNOSIS — I48.91 ATRIAL FIBRILLATION WITH RAPID VENTRICULAR RESPONSE (HCC): Primary | ICD-10-CM

## 2018-08-27 LAB
ALBUMIN SERPL-MCNC: 4 G/DL (ref 3.5–5)
ALBUMIN/GLOB SERPL: 1.3 G/DL (ref 1.1–2.5)
ALP SERPL-CCNC: 97 U/L (ref 24–120)
ALT SERPL W P-5'-P-CCNC: 54 U/L (ref 0–54)
ANION GAP SERPL CALCULATED.3IONS-SCNC: 6 MMOL/L (ref 4–13)
AST SERPL-CCNC: 51 U/L (ref 7–45)
BASOPHILS # BLD AUTO: 0.11 10*3/MM3 (ref 0–0.2)
BASOPHILS NFR BLD AUTO: 1 % (ref 0–2)
BILIRUB SERPL-MCNC: 0.5 MG/DL (ref 0.1–1)
BUN BLD-MCNC: 20 MG/DL (ref 5–21)
BUN/CREAT SERPL: 30.8 (ref 7–25)
CALCIUM SPEC-SCNC: 9.6 MG/DL (ref 8.4–10.4)
CHLORIDE SERPL-SCNC: 106 MMOL/L (ref 98–110)
CO2 SERPL-SCNC: 29 MMOL/L (ref 24–31)
CREAT BLD-MCNC: 0.65 MG/DL (ref 0.5–1.4)
D DIMER PPP FEU-MCNC: 0.46 MG/L (FEU) (ref 0–0.5)
DEPRECATED RDW RBC AUTO: 43.4 FL (ref 40–54)
EOSINOPHIL # BLD AUTO: 0.45 10*3/MM3 (ref 0–0.7)
EOSINOPHIL NFR BLD AUTO: 4.3 % (ref 0–4)
ERYTHROCYTE [DISTWIDTH] IN BLOOD BY AUTOMATED COUNT: 13.6 % (ref 12–15)
GFR SERPL CREATININE-BSD FRML MDRD: 92 ML/MIN/1.73
GLOBULIN UR ELPH-MCNC: 3.2 GM/DL
GLUCOSE BLD-MCNC: 150 MG/DL (ref 70–100)
GLUCOSE BLDC GLUCOMTR-MCNC: 178 MG/DL (ref 70–130)
GLUCOSE BLDC GLUCOMTR-MCNC: 73 MG/DL (ref 70–130)
HCT VFR BLD AUTO: 39.4 % (ref 37–47)
HGB BLD-MCNC: 12.9 G/DL (ref 12–16)
HOLD SPECIMEN: NORMAL
HOLD SPECIMEN: NORMAL
IMM GRANULOCYTES # BLD: 0.05 10*3/MM3 (ref 0–0.03)
IMM GRANULOCYTES NFR BLD: 0.5 % (ref 0–5)
INR PPP: 0.96 (ref 0.91–1.09)
LYMPHOCYTES # BLD AUTO: 2.7 10*3/MM3 (ref 0.72–4.86)
LYMPHOCYTES NFR BLD AUTO: 25.7 % (ref 15–45)
MCH RBC QN AUTO: 28.6 PG (ref 28–32)
MCHC RBC AUTO-ENTMCNC: 32.7 G/DL (ref 33–36)
MCV RBC AUTO: 87.4 FL (ref 82–98)
MONOCYTES # BLD AUTO: 0.67 10*3/MM3 (ref 0.19–1.3)
MONOCYTES NFR BLD AUTO: 6.4 % (ref 4–12)
NEUTROPHILS # BLD AUTO: 6.51 10*3/MM3 (ref 1.87–8.4)
NEUTROPHILS NFR BLD AUTO: 62.1 % (ref 39–78)
NRBC BLD MANUAL-RTO: 0 /100 WBC (ref 0–0)
PLATELET # BLD AUTO: 226 10*3/MM3 (ref 130–400)
PMV BLD AUTO: 11.6 FL (ref 6–12)
POTASSIUM BLD-SCNC: 4.3 MMOL/L (ref 3.5–5.3)
PROT SERPL-MCNC: 7.2 G/DL (ref 6.3–8.7)
PROTHROMBIN TIME: 13.1 SECONDS (ref 11.9–14.6)
RBC # BLD AUTO: 4.51 10*6/MM3 (ref 4.2–5.4)
SODIUM BLD-SCNC: 141 MMOL/L (ref 135–145)
TROPONIN I SERPL-MCNC: <0.012 NG/ML (ref 0–0.03)
TROPONIN I SERPL-MCNC: <0.012 NG/ML (ref 0–0.03)
WBC NRBC COR # BLD: 10.49 10*3/MM3 (ref 4.8–10.8)
WHOLE BLOOD HOLD SPECIMEN: NORMAL
WHOLE BLOOD HOLD SPECIMEN: NORMAL

## 2018-08-27 PROCEDURE — 71045 X-RAY EXAM CHEST 1 VIEW: CPT

## 2018-08-27 PROCEDURE — 82962 GLUCOSE BLOOD TEST: CPT

## 2018-08-27 PROCEDURE — G0378 HOSPITAL OBSERVATION PER HR: HCPCS

## 2018-08-27 PROCEDURE — 25010000002 ENOXAPARIN PER 10 MG: Performed by: EMERGENCY MEDICINE

## 2018-08-27 PROCEDURE — 85025 COMPLETE CBC W/AUTO DIFF WBC: CPT | Performed by: EMERGENCY MEDICINE

## 2018-08-27 PROCEDURE — 80053 COMPREHEN METABOLIC PANEL: CPT | Performed by: EMERGENCY MEDICINE

## 2018-08-27 PROCEDURE — 93005 ELECTROCARDIOGRAM TRACING: CPT | Performed by: EMERGENCY MEDICINE

## 2018-08-27 PROCEDURE — 84484 ASSAY OF TROPONIN QUANT: CPT | Performed by: PHYSICIAN ASSISTANT

## 2018-08-27 PROCEDURE — 93010 ELECTROCARDIOGRAM REPORT: CPT | Performed by: INTERNAL MEDICINE

## 2018-08-27 PROCEDURE — 36415 COLL VENOUS BLD VENIPUNCTURE: CPT | Performed by: EMERGENCY MEDICINE

## 2018-08-27 PROCEDURE — 84484 ASSAY OF TROPONIN QUANT: CPT | Performed by: EMERGENCY MEDICINE

## 2018-08-27 PROCEDURE — 99285 EMERGENCY DEPT VISIT HI MDM: CPT

## 2018-08-27 PROCEDURE — 85610 PROTHROMBIN TIME: CPT | Performed by: EMERGENCY MEDICINE

## 2018-08-27 PROCEDURE — 85379 FIBRIN DEGRADATION QUANT: CPT | Performed by: EMERGENCY MEDICINE

## 2018-08-27 RX ORDER — ASPIRIN 81 MG/1
324 TABLET, CHEWABLE ORAL ONCE
Status: COMPLETED | OUTPATIENT
Start: 2018-08-27 | End: 2018-08-27

## 2018-08-27 RX ORDER — DILTIAZEM HYDROCHLORIDE 5 MG/ML
10 INJECTION INTRAVENOUS ONCE
Status: COMPLETED | OUTPATIENT
Start: 2018-08-27 | End: 2018-08-27

## 2018-08-27 RX ORDER — LEVOTHYROXINE SODIUM 0.15 MG/1
150 TABLET ORAL
Status: DISCONTINUED | OUTPATIENT
Start: 2018-08-28 | End: 2018-08-29 | Stop reason: HOSPADM

## 2018-08-27 RX ORDER — METOPROLOL SUCCINATE 50 MG/1
50 TABLET, EXTENDED RELEASE ORAL NIGHTLY
Status: DISCONTINUED | OUTPATIENT
Start: 2018-08-27 | End: 2018-08-29 | Stop reason: HOSPADM

## 2018-08-27 RX ORDER — SODIUM CHLORIDE 0.9 % (FLUSH) 0.9 %
10 SYRINGE (ML) INJECTION AS NEEDED
Status: DISCONTINUED | OUTPATIENT
Start: 2018-08-27 | End: 2018-08-29 | Stop reason: HOSPADM

## 2018-08-27 RX ORDER — SODIUM CHLORIDE 0.9 % (FLUSH) 0.9 %
1-10 SYRINGE (ML) INJECTION AS NEEDED
Status: DISCONTINUED | OUTPATIENT
Start: 2018-08-27 | End: 2018-08-29 | Stop reason: HOSPADM

## 2018-08-27 RX ORDER — PANTOPRAZOLE SODIUM 40 MG/1
40 TABLET, DELAYED RELEASE ORAL EVERY MORNING
Status: DISCONTINUED | OUTPATIENT
Start: 2018-08-28 | End: 2018-08-29 | Stop reason: HOSPADM

## 2018-08-27 RX ORDER — FLUOXETINE HYDROCHLORIDE 20 MG/1
20 CAPSULE ORAL DAILY
Status: DISCONTINUED | OUTPATIENT
Start: 2018-08-27 | End: 2018-08-29 | Stop reason: HOSPADM

## 2018-08-27 RX ADMIN — ASPIRIN 81 MG CHEWABLE TABLET 243 MG: 81 TABLET CHEWABLE at 13:46

## 2018-08-27 RX ADMIN — RIVAROXABAN 20 MG: 20 TABLET, FILM COATED ORAL at 20:38

## 2018-08-27 RX ADMIN — METOPROLOL SUCCINATE 50 MG: 50 TABLET, FILM COATED, EXTENDED RELEASE ORAL at 20:38

## 2018-08-27 RX ADMIN — METFORMIN HYDROCHLORIDE 500 MG: 500 TABLET ORAL at 20:38

## 2018-08-27 RX ADMIN — DILTIAZEM HYDROCHLORIDE 10 MG: 5 INJECTION INTRAVENOUS at 13:54

## 2018-08-27 RX ADMIN — ENOXAPARIN SODIUM 110 MG: 120 INJECTION SUBCUTANEOUS at 13:45

## 2018-08-27 RX ADMIN — DILTIAZEM HYDROCHLORIDE 5 MG/HR: 5 INJECTION INTRAVENOUS at 13:49

## 2018-08-27 RX ADMIN — FLUOXETINE HYDROCHLORIDE 20 MG: 20 CAPSULE ORAL at 20:38

## 2018-08-28 PROBLEM — E03.9 HYPOTHYROID: Status: ACTIVE | Noted: 2018-08-28

## 2018-08-28 LAB
GLUCOSE BLDC GLUCOMTR-MCNC: 106 MG/DL (ref 70–130)
GLUCOSE BLDC GLUCOMTR-MCNC: 121 MG/DL (ref 70–130)
GLUCOSE BLDC GLUCOMTR-MCNC: 122 MG/DL (ref 70–130)
TROPONIN I SERPL-MCNC: <0.012 NG/ML (ref 0–0.03)
TROPONIN I SERPL-MCNC: <0.012 NG/ML (ref 0–0.03)

## 2018-08-28 PROCEDURE — 84484 ASSAY OF TROPONIN QUANT: CPT | Performed by: PHYSICIAN ASSISTANT

## 2018-08-28 PROCEDURE — 99244 OFF/OP CNSLTJ NEW/EST MOD 40: CPT | Performed by: INTERNAL MEDICINE

## 2018-08-28 PROCEDURE — 93005 ELECTROCARDIOGRAM TRACING: CPT | Performed by: PHYSICIAN ASSISTANT

## 2018-08-28 PROCEDURE — G0378 HOSPITAL OBSERVATION PER HR: HCPCS

## 2018-08-28 PROCEDURE — 82962 GLUCOSE BLOOD TEST: CPT

## 2018-08-28 RX ORDER — DILTIAZEM HYDROCHLORIDE 120 MG/1
120 CAPSULE, COATED, EXTENDED RELEASE ORAL
Status: DISCONTINUED | OUTPATIENT
Start: 2018-08-28 | End: 2018-08-29 | Stop reason: HOSPADM

## 2018-08-28 RX ADMIN — LOSARTAN POTASSIUM: 50 TABLET, FILM COATED ORAL at 13:38

## 2018-08-28 RX ADMIN — RIVAROXABAN 20 MG: 20 TABLET, FILM COATED ORAL at 08:31

## 2018-08-28 RX ADMIN — METFORMIN HYDROCHLORIDE 500 MG: 500 TABLET ORAL at 18:05

## 2018-08-28 RX ADMIN — METFORMIN HYDROCHLORIDE 500 MG: 500 TABLET ORAL at 08:31

## 2018-08-28 RX ADMIN — LEVOTHYROXINE SODIUM 150 MCG: 150 TABLET ORAL at 08:31

## 2018-08-28 RX ADMIN — METOPROLOL SUCCINATE 50 MG: 50 TABLET, FILM COATED, EXTENDED RELEASE ORAL at 20:00

## 2018-08-28 RX ADMIN — DILTIAZEM HYDROCHLORIDE 120 MG: 120 CAPSULE, COATED, EXTENDED RELEASE ORAL at 10:21

## 2018-08-28 RX ADMIN — PANTOPRAZOLE SODIUM 40 MG: 40 TABLET, DELAYED RELEASE ORAL at 06:04

## 2018-08-28 RX ADMIN — FLUOXETINE HYDROCHLORIDE 20 MG: 20 CAPSULE ORAL at 20:01

## 2018-08-29 ENCOUNTER — ANESTHESIA EVENT (OUTPATIENT)
Dept: CARDIOLOGY | Facility: HOSPITAL | Age: 63
End: 2018-08-29

## 2018-08-29 ENCOUNTER — APPOINTMENT (OUTPATIENT)
Dept: CARDIOLOGY | Facility: HOSPITAL | Age: 63
End: 2018-08-29
Attending: INTERNAL MEDICINE

## 2018-08-29 ENCOUNTER — ANESTHESIA (OUTPATIENT)
Dept: CARDIOLOGY | Facility: HOSPITAL | Age: 63
End: 2018-08-29

## 2018-08-29 VITALS
SYSTOLIC BLOOD PRESSURE: 123 MMHG | DIASTOLIC BLOOD PRESSURE: 49 MMHG | BODY MASS INDEX: 44.72 KG/M2 | TEMPERATURE: 97.2 F | HEIGHT: 62 IN | HEART RATE: 63 BPM | RESPIRATION RATE: 15 BRPM | OXYGEN SATURATION: 97 % | WEIGHT: 243 LBS

## 2018-08-29 PROCEDURE — 93005 ELECTROCARDIOGRAM TRACING: CPT | Performed by: INTERNAL MEDICINE

## 2018-08-29 PROCEDURE — 5A2204Z RESTORATION OF CARDIAC RHYTHM, SINGLE: ICD-10-PCS | Performed by: INTERNAL MEDICINE

## 2018-08-29 PROCEDURE — G0378 HOSPITAL OBSERVATION PER HR: HCPCS

## 2018-08-29 PROCEDURE — 93325 DOPPLER ECHO COLOR FLOW MAPG: CPT | Performed by: INTERNAL MEDICINE

## 2018-08-29 PROCEDURE — 93010 ELECTROCARDIOGRAM REPORT: CPT | Performed by: INTERNAL MEDICINE

## 2018-08-29 PROCEDURE — 93312 ECHO TRANSESOPHAGEAL: CPT

## 2018-08-29 PROCEDURE — 93321 DOPPLER ECHO F-UP/LMTD STD: CPT | Performed by: INTERNAL MEDICINE

## 2018-08-29 PROCEDURE — 93312 ECHO TRANSESOPHAGEAL: CPT | Performed by: INTERNAL MEDICINE

## 2018-08-29 PROCEDURE — 25010000002 PROPOFOL 10 MG/ML EMULSION: Performed by: NURSE ANESTHETIST, CERTIFIED REGISTERED

## 2018-08-29 PROCEDURE — 92960 CARDIOVERSION ELECTRIC EXT: CPT | Performed by: INTERNAL MEDICINE

## 2018-08-29 PROCEDURE — 92960 CARDIOVERSION ELECTRIC EXT: CPT

## 2018-08-29 PROCEDURE — 93325 DOPPLER ECHO COLOR FLOW MAPG: CPT

## 2018-08-29 PROCEDURE — 93321 DOPPLER ECHO F-UP/LMTD STD: CPT

## 2018-08-29 RX ORDER — LIDOCAINE HYDROCHLORIDE 20 MG/ML
INJECTION, SOLUTION INFILTRATION; PERINEURAL AS NEEDED
Status: DISCONTINUED | OUTPATIENT
Start: 2018-08-29 | End: 2018-08-29 | Stop reason: SURG

## 2018-08-29 RX ORDER — PROPOFOL 10 MG/ML
VIAL (ML) INTRAVENOUS AS NEEDED
Status: DISCONTINUED | OUTPATIENT
Start: 2018-08-29 | End: 2018-08-29 | Stop reason: SURG

## 2018-08-29 RX ORDER — SODIUM CHLORIDE 9 MG/ML
80 INJECTION, SOLUTION INTRAVENOUS CONTINUOUS
Status: DISCONTINUED | OUTPATIENT
Start: 2018-08-29 | End: 2018-08-29 | Stop reason: HOSPADM

## 2018-08-29 RX ADMIN — DRONEDARONE 400 MG: 400 TABLET, FILM COATED ORAL at 17:54

## 2018-08-29 RX ADMIN — SODIUM CHLORIDE 80 ML/HR: 9 INJECTION, SOLUTION INTRAVENOUS at 11:22

## 2018-08-29 RX ADMIN — RIVAROXABAN 20 MG: 20 TABLET, FILM COATED ORAL at 08:25

## 2018-08-29 RX ADMIN — LEVOTHYROXINE SODIUM 150 MCG: 150 TABLET ORAL at 08:25

## 2018-08-29 RX ADMIN — PROPOFOL 230 MG: 10 INJECTION, EMULSION INTRAVENOUS at 12:35

## 2018-08-29 RX ADMIN — DILTIAZEM HYDROCHLORIDE 120 MG: 120 CAPSULE, COATED, EXTENDED RELEASE ORAL at 08:25

## 2018-08-29 RX ADMIN — LIDOCAINE HYDROCHLORIDE 100 MG: 20 INJECTION, SOLUTION INFILTRATION; PERINEURAL at 12:35

## 2018-08-29 RX ADMIN — PANTOPRAZOLE SODIUM 40 MG: 40 TABLET, DELAYED RELEASE ORAL at 06:29

## 2018-08-29 RX ADMIN — TOPICAL ANESTHETIC: 200 SPRAY DENTAL; PERIODONTAL at 18:01

## 2018-08-29 RX ADMIN — METFORMIN HYDROCHLORIDE 500 MG: 500 TABLET ORAL at 17:54

## 2018-08-29 RX ADMIN — BENZOCAINE, BUTAMBEN, AND TETRACAINE HYDROCHLORIDE 1 SPRAY: .028; .004; .004 AEROSOL, SPRAY TOPICAL at 12:27

## 2018-08-29 NOTE — ANESTHESIA POSTPROCEDURE EVALUATION
Patient: Nia Barker    Procedure Summary     Date:  08/29/18 Room / Location:  [facility] CLOSE OBSERVATION UNIT    Anesthesia Start:  1224 Anesthesia Stop:  1250    Procedures:       CARDIOVERSION EXTERNAL IN CARDIOLOGY DEPARTMENT      ADULT TRANSESOPHAGEAL ECHO (KAHLIL) W/ CONT IF NECESSARY PER PROTOCOL Diagnosis:       (symptomatic af)      (Arrhythmia)      (AFib)      (Pre-cardioversion)    Scheduled Providers:   Provider:  Urvashi Osorio CRNA    Anesthesia Type:  MAC ASA Status:  3          Anesthesia Type: MAC  Last vitals  BP   100/52   Temp   97.7 °F (36.5 °C) (08/29/18 1109)   Pulse   56   Resp   18   SpO2   96%     Post Anesthesia Care and Evaluation    Patient location during evaluation: PHASE II  Patient participation: complete - patient participated  Level of consciousness: responsive to light touch  Pain management: adequate  Airway patency: patent  Anesthetic complications: No anesthetic complications  PONV Status: none  Cardiovascular status: acceptable  Respiratory status: acceptable  Hydration status: acceptable

## 2018-08-29 NOTE — ANESTHESIA PREPROCEDURE EVALUATION
Anesthesia Evaluation     Patient summary reviewed and Nursing notes reviewed   no history of anesthetic complications:  NPO Solid Status: > 8 hours  NPO Liquid Status: > 8 hours           Airway   Mallampati: II  TM distance: >3 FB  Neck ROM: full  No difficulty expected  Dental - normal exam     Pulmonary    (+) sleep apnea,   (-) asthma, not a smoker  Cardiovascular   Exercise tolerance: poor (<4 METS) (Limited by shortness of breath, denies chest pain)    Patient on routine beta blocker and Beta blocker given within 24 hours of surgery    (+) hypertension, dysrhythmias Atrial Fib, PVD,   (-) angina      Neuro/Psych  (+) headaches,     (-) seizures, TIA, CVA  GI/Hepatic/Renal/Endo    (+) morbid obesity, GERD well controlled,  diabetes mellitus, hypothyroidism,   (-) liver disease, no renal disease    Musculoskeletal     Abdominal    Substance History      OB/GYN          Other                          Anesthesia Plan    ASA 3     MAC   (Pt admitted with afib with rvr on 8/27. Continued symptoms despite being rate controlled so planning cardioversion. Pt had been of anticoagulation for a few days so also performing stacey today.)  intravenous induction   Anesthetic plan and risks discussed with patient.

## 2018-08-30 ENCOUNTER — TELEPHONE (OUTPATIENT)
Dept: CARDIOLOGY | Facility: CLINIC | Age: 63
End: 2018-08-30

## 2018-08-30 DIAGNOSIS — I48.0 PAF (PAROXYSMAL ATRIAL FIBRILLATION) (HCC): Primary | ICD-10-CM

## 2018-09-01 ENCOUNTER — NURSE TRIAGE (OUTPATIENT)
Dept: CALL CENTER | Facility: HOSPITAL | Age: 63
End: 2018-09-01

## 2018-09-01 VITALS — WEIGHT: 224 LBS | BODY MASS INDEX: 40.97 KG/M2

## 2018-09-01 NOTE — TELEPHONE ENCOUNTER
"Caller is not SOA at rest but if she walks across the room she gets winded with minimal exertion.     Reason for Disposition  • [1] Skipped or extra beat(s) AND [2] increases with exercise or exertion    Additional Information  • Negative: Passed out (i.e., lost consciousness, collapsed and was not responding)  • Negative: Shock suspected (e.g., cold/pale/clammy skin, too weak to stand, low BP, rapid pulse)  • Negative: Difficult to awaken or acting confused (e.g., disoriented, slurred speech)  • Negative: Visible sweat on face or sweat dripping down face  • Negative: Unable to walk, or can only walk with assistance (e.g., requires support)  • Negative: [1] Received SHOCK from implantable cardiac defibrillator AND [2] persisting symptoms (i.e., palpitations, lightheadedness)  • Negative: Sounds like a life-threatening emergency to the triager  • Negative: Chest pain  • Negative: Difficulty breathing  • Negative: Dizziness, lightheadedness, or weakness  • Negative: [1] Heart beating very rapidly (e.g., > 140 / minute) AND [2] present now  (Exception: during exercise)  • Negative: Heart beating very slowly (e.g., < 50 / minute)  (Exception: athlete)  • Negative: New or worsened shortness of breath with activity (dyspnea on exertion)  • Negative: Patient sounds very sick or weak to the triager  • Negative: [1] Heart beating very rapidly (e.g., > 140 / minute) AND [2] not present now  (Exception: during exercise)    Answer Assessment - Initial Assessment Questions  1. DESCRIPTION: \"Please describe your heart rate or heart beat that you are having\" (e.g., fast/slow, regular/irregular, skipped or extra beats, \"palpitations\")      121, can feel she has gone back into afib  2. ONSET: \"When did it start?\" (Minutes, hours or days)       1am this morning  3. DURATION: \"How long does it last\" (e.g., seconds, minutes, hours)      Constant  4. PATTERN \"Does it come and go, or has it been constant since it started?\"  \"Does it get " "worse with exertion?\"   \"Are you feeling it now?\"      Yes  5. TAP: \"Using your hand, can you tap out what you are feeling on a chair or table in front of you, so that I can hear?\" (Note: not all patients can do this)        States irregular  6. HEART RATE: \"Can you tell me your heart rate?\" \"How many beats in 15 seconds?\"  (Note: not all patients can do this)        121/min  7. RECURRENT SYMPTOM: \"Have you ever had this before?\" If so, ask: \"When was the last time?\" and \"What happened that time?\"       Was d/c on  8/29/18 after having cardioversion  8. CAUSE: \"What do you think is causing the palpitations?\"      Afib  9. CARDIAC HISTORY: \"Do you have any history of heart disease?\" (e.g., heart attack, angina, bypass surgery, angioplasty, arrhythmia)       None, just the afib  10. OTHER SYMPTOMS: \"Do you have any other symptoms?\" (e.g., dizziness, chest pain, sweating, difficulty breathing)        SOA  11. PREGNANCY: \"Is there any chance you are pregnant?\" \"When was your last menstrual period?\"        N/A    Protocols used: HEART RATE AND HEARTBEAT QUESTIONS-ADULT-AH      "

## 2018-09-04 NOTE — TELEPHONE ENCOUNTER
Patient states it is usually after she finishes eating. Patient states that her symptoms have eased up, and not bothering her as much now.

## 2018-09-07 ENCOUNTER — TELEPHONE (OUTPATIENT)
Dept: CARDIOLOGY | Facility: CLINIC | Age: 63
End: 2018-09-07

## 2018-09-07 NOTE — TELEPHONE ENCOUNTER
Spoke to Selena she states that Angie Roger NP said that if she can come now they could work her into the schedule, I tried contacting the patient and she did not answer I left VM to call back asap.

## 2018-09-07 NOTE — TELEPHONE ENCOUNTER
Before we jump to conclusion that it's Multaq - we should probably make sure she's not back in afib (as that might be causing it).  She could come in today to see a NP if possible and get an ECG

## 2018-09-07 NOTE — TELEPHONE ENCOUNTER
----- Message from Selena Keller MA sent at 9/7/2018  8:23 AM CDT -----  Dr Booker office has called stating that this patient has been having more SOB since on Multaq. Please call and ask all her symptoms and see if possibly her Multaq could be changed

## 2018-09-07 NOTE — TELEPHONE ENCOUNTER
Dr Stevens, Selena let me know this morning that this patient is still complaining of SOB, states it is from her Multaq. I did advise her before when she had called to make sure she was taking it with food, she advised that she was.   She did state the last time I talked to her that it had eased up but now it is back.     Please advise.

## 2018-09-10 ENCOUNTER — TELEPHONE (OUTPATIENT)
Dept: CARDIOLOGY | Facility: CLINIC | Age: 63
End: 2018-09-10

## 2018-09-10 NOTE — TELEPHONE ENCOUNTER
Patient is currently wearing ZIO patch that is due to be taken off on Thursday (9/13/18). Informed patient to continue to monitor symptoms and keep a record of BP and HR. Advised patient that we would review the results of her ZIO patch once we receive them, and notify her of any changes that need to be made. Informed patient to call our office if her symptoms worsen. Patient verbalized understanding.

## 2018-09-10 NOTE — TELEPHONE ENCOUNTER
"Called patient. She states that she thinks she is still in Afib. She has been monitoring HR and BP with home monitor, and she states it has been notifying her of an irregular HR. She c/o SOB w/ exertion, feeling tired, and an occasional chest pain. She states that she chest pain \"really isn't enough to complain about\". BP readings from this morning were: 114/93, pulse 98; 98/76, pulse 91; 102/90, pulse 75. Please advise.   "

## 2018-09-10 NOTE — TELEPHONE ENCOUNTER
Patient calling in stating that she was in Afib, and it started Sunday night and is not sure what she needs to do.     Please advise.

## 2018-09-10 NOTE — TELEPHONE ENCOUNTER
She's wearing a ZIO right? If so, and her symptoms are present but not severe, I'd advise to continue documenting symptoms with monitor then can review, once it's been returned and interpreted, if her symptoms correlate with AF and go from there.

## 2018-09-19 ENCOUNTER — TELEPHONE (OUTPATIENT)
Dept: CARDIOLOGY | Facility: CLINIC | Age: 63
End: 2018-09-19

## 2018-09-19 NOTE — TELEPHONE ENCOUNTER
Patient calling in stating that her heart rate is very low while at rest, state it runs between low 50's, she does have Afib, and is worried it is getting too low while sleep, she thinks her beta blocker is what is doing it to her, and thinks she needs to start back on amlodipine.     Please advise.

## 2018-09-20 NOTE — TELEPHONE ENCOUNTER
She can stop her beta blocker.  Is she still taking the Multaq? The amlodipine is only for BP, not HR, so she wouldn't need this unless her BP stays too high once off the beta blocker (toprol xl)

## 2018-09-20 NOTE — TELEPHONE ENCOUNTER
She just needs to be seen - this has been way too many changes in therapy and symptomatic reports to address over phone. She can be seen by NP tomorrow if she can't wait til scheduled appt with me next week.

## 2018-09-20 NOTE — TELEPHONE ENCOUNTER
Patient is not taking her Multaq, she states last night her /106 and HR was 110, states she went back into afib again. Please advise what you'd like to do.

## 2018-09-25 PROBLEM — G47.30 SLEEP APNEA: Status: ACTIVE | Noted: 2018-09-25

## 2018-09-26 ENCOUNTER — OFFICE VISIT (OUTPATIENT)
Dept: CARDIOLOGY | Facility: CLINIC | Age: 63
End: 2018-09-26

## 2018-09-26 VITALS
WEIGHT: 245 LBS | DIASTOLIC BLOOD PRESSURE: 61 MMHG | HEIGHT: 62 IN | SYSTOLIC BLOOD PRESSURE: 131 MMHG | HEART RATE: 82 BPM | BODY MASS INDEX: 45.08 KG/M2

## 2018-09-26 DIAGNOSIS — E66.01 MORBID OBESITY (HCC): ICD-10-CM

## 2018-09-26 DIAGNOSIS — I48.0 PAF (PAROXYSMAL ATRIAL FIBRILLATION) (HCC): Primary | ICD-10-CM

## 2018-09-26 DIAGNOSIS — G47.33 OBSTRUCTIVE SLEEP APNEA SYNDROME: ICD-10-CM

## 2018-09-26 DIAGNOSIS — I10 ESSENTIAL HYPERTENSION: ICD-10-CM

## 2018-09-26 PROCEDURE — 93000 ELECTROCARDIOGRAM COMPLETE: CPT | Performed by: INTERNAL MEDICINE

## 2018-09-26 PROCEDURE — 99214 OFFICE O/P EST MOD 30 MIN: CPT | Performed by: INTERNAL MEDICINE

## 2018-09-26 RX ORDER — DILTIAZEM HYDROCHLORIDE 180 MG/1
180 CAPSULE, COATED, EXTENDED RELEASE ORAL DAILY
COMMUNITY
End: 2019-04-30

## 2018-09-26 RX ORDER — LEVOTHYROXINE SODIUM 0.15 MG/1
150 TABLET ORAL DAILY
COMMUNITY
End: 2019-04-30 | Stop reason: DRUGHIGH

## 2018-09-26 NOTE — PROGRESS NOTES
Subjective:     Encounter Date:09/26/2018      Patient ID: Nia Barker is a 62 y.o. female.    Chief Complaint: f/u atrial fibrillation  Mrs. Barker is a 62 year old female whom I met during a brief hospitalization in August 2018, when she presented with palpitations and associated fatigue and dyspnea for 9 days  Upon arrival, she was noted to be in atrial fibrillation with rapid ventricular response. Ventricular rate was slowed with cardizem infusion, but despite being rate controlled, she remained fairly symptomatic with the aforementioned symptoms.  Therefore, she underwent successful KAHLIL-guided cardioversion on 8/29/18, and was discharged home on Multaq for rhythm control, Toprol XL 50 for rate control should afib recur, and Xarelto for CVA prophylaxis. She has called in multiple times since, complaining of GI upset initially that she thought was Xarelto, but then that got better. Then she called with shortness of air that she thought was from Multaq, and later called worried that her heart rate was too low and wanted to stop her Toprol.  She then called back stating she was back in AFib again (two separate times). Today, she reports she stopped taking Multaq about two weeks ago due to worsened nausea, shortness of breath, worsened fatigue, and muscle weakness.  After stopping, those symptoms got better.  However, she continues to be symptomatic with palpitations and dyspnea.      Saw Dr. Lockhart last Friday - ECG showed AF with RVR (122 bpm) so was started on cardizem.  She reports her dyspnea has worsened since then.  At this point, in addition to daily Xarelto, she takes Toprol XL 25mg BID and Cardizem 180mg daily.    She has kept an extensive log of blood pressure and heart rate, which I did review today.  Blood pressure has been well-controlled.      Atrial Fibrillation   Presents for follow-up visit. Symptoms include palpitations and shortness of breath. Symptoms are negative for chest pain and  syncope. The symptoms have been worsening. Past medical history includes atrial fibrillation.   Hypertension   The problem is controlled. Associated symptoms include palpitations and shortness of breath. Pertinent negatives include no chest pain, orthopnea or PND.   Obesity   The current episode started more than 1 year ago. The problem has been unchanged. Associated symptoms include nausea. Pertinent negatives include no chest pain or coughing.     The following portions of the patient's history were reviewed and updated as appropriate: allergies, current medications, past family history, past medical history, past social history, past surgical history and problem list.    Review of Systems   Constitution: Negative for weight gain and weight loss.   Cardiovascular: Positive for dyspnea on exertion, irregular heartbeat and palpitations. Negative for chest pain, leg swelling, near-syncope, orthopnea, paroxysmal nocturnal dyspnea and syncope.   Respiratory: Positive for shortness of breath. Negative for cough.    Hematologic/Lymphatic: Does not bruise/bleed easily.   Gastrointestinal: Positive for nausea.        Objective:      Vitals:    09/26/18 1254   BP: 131/61   Pulse: 82     Physical Exam   Constitutional: She is oriented to person, place, and time. She appears well-developed and well-nourished.   Neck: No JVD present.   Cardiovascular: Normal rate, normal heart sounds and intact distal pulses.  An irregularly irregular rhythm present.   No murmur heard.  Pulmonary/Chest: Effort normal and breath sounds normal.   Musculoskeletal: She exhibits no edema.   Neurological: She is alert and oriented to person, place, and time.   Skin: Skin is warm and dry.       Lab Review:         ECG 12 Lead  Date/Time: 9/26/2018 8:55 PM  Performed by: GAMA HASSAN  Authorized by: GAMA HASSAN   Comparison: compared with previous ECG from 8/29/2018  Comparison to previous ECG: AFib has replaced sinus opal, and criteria for  septal infarct now present  Rhythm: atrial fibrillation  Rate: normal  BPM: 82  QRS axis: normal  Q waves: V1 and V2  Clinical impression: abnormal ECG                    Assessment/Plan:     Problem List Items Addressed This Visit        Cardiovascular and Mediastinum    Hypertension    Current Assessment & Plan     Well-controlled; continue current therapy.         Relevant Medications    diltiaZEM CD (CARDIZEM CD) 180 MG 24 hr capsule    PAF (paroxysmal atrial fibrillation) (CMS/HCC) - Primary    Overview     Initially diagnosed 8/28/18, but reports intermittent symptoms for several years prior to this.  CHADS2-VASc=2 (HTN, female)  Successful KAHLIL-guided cardioversion 8/29/18 - felt better in NSR but started having symptomatic paroxysms within days of DCCV despite having been started on Multaq         Current Assessment & Plan     Symptoms are worsening; patient was intolerant to Multaq  Has been appropriately anticoagulated, and patient is seeking most immediate route of symptom resolution possible.  For this reason, we will schedule for another cardioversion tomorrow.  I did discuss with her that she would likely go back into atrial fibrillation at some point afterwards, especially since she has been off antiarrhythmic therapy now for 2 weeks.  She did agree to start a different antiarrhythmic, so I have prescribed flecainide 50 mg twice a day to be started tonight.  We will continue this afterwards in hopes of maintaining sinus rhythm.    Also, despite being in rapid ventricular response last Friday and being rate controlled today, she thinks her dyspnea has gotten worse since starting Cardizem.  As such, it does not appear as of her dyspnea was from the rapid ventricular response itself since Cardizem has effectively treated that.  I did  her that rate control medications were merely for symptom improvement, she thinks she is doing worse on the medication, she can discontinue it at her discretion.    I  also placed a referral to Dr. Rodriguez, as I feel she would be well served by ablation/PVI.    Lastly, due to severe arthritic pain, she states she cannot do without meloxicam.  She is on antacid medication at this point in time for GI protection.  We have previously discussed Watchman implantation as a possible alternative to long-term anticoagulant therapy.  We will discuss this further at follow-up visits, as the decision to ablate or not, and subsequent success of said possible ablation, may alter her CVA risk moving forward.         Relevant Medications    diltiaZEM CD (CARDIZEM CD) 180 MG 24 hr capsule       Digestive    Morbid obesity (CMS/HCC)    Current Assessment & Plan     Discussed link of obesity with burden of atrial fibrillation and encouraged ongoing weight loss.            Other    Sleep apnea    Current Assessment & Plan     Discussed how compliance with CPAP has been shown to reduce burden of atrial fibrillation.             F/u TBD after cardioversion tomorrow    Peter Stevens MD  09/26/2018  9:06 PM

## 2018-09-27 ENCOUNTER — HOSPITAL ENCOUNTER (OUTPATIENT)
Dept: CARDIOLOGY | Facility: HOSPITAL | Age: 63
Discharge: HOME | End: 2018-09-27
Attending: INTERNAL MEDICINE | Admitting: INTERNAL MEDICINE

## 2018-09-27 DIAGNOSIS — I48.91 ATRIAL FIBRILLATION WITH RAPID VENTRICULAR RESPONSE (HCC): ICD-10-CM

## 2018-09-27 PROCEDURE — 93010 ELECTROCARDIOGRAM REPORT: CPT | Performed by: INTERNAL MEDICINE

## 2018-09-27 PROCEDURE — 93005 ELECTROCARDIOGRAM TRACING: CPT | Performed by: INTERNAL MEDICINE

## 2018-09-27 NOTE — NURSING NOTE
Pt here for cardioversion, sinus rhythm on monitor, EKG obtained. Dr Stevens notified, MD spoke with pt Via phone, follow up appointment made for 2 weeks and pt sent home.

## 2018-09-27 NOTE — ASSESSMENT & PLAN NOTE
Symptoms are worsening; patient was intolerant to Multaq  Has been appropriately anticoagulated, and patient is seeking most immediate route of symptom resolution possible.  For this reason, we will schedule for another cardioversion tomorrow.  I did discuss with her that she would likely go back into atrial fibrillation at some point afterwards, especially since she has been off antiarrhythmic therapy now for 2 weeks.  She did agree to start a different antiarrhythmic, so I have prescribed flecainide 50 mg twice a day to be started tonight.  We will continue this afterwards in hopes of maintaining sinus rhythm.    Also, despite being in rapid ventricular response last Friday and being rate controlled today, she thinks her dyspnea has gotten worse since starting Cardizem.  As such, it does not appear as of her dyspnea was from the rapid ventricular response itself since Cardizem has effectively treated that.  I did  her that rate control medications were merely for symptom improvement, she thinks she is doing worse on the medication, she can discontinue it at her discretion.    I also placed a referral to Dr. Rodriguez, as I feel she would be well served by ablation/PVI.    Lastly, due to severe arthritic pain, she states she cannot do without meloxicam.  She is on antacid medication at this point in time for GI protection.  We have previously discussed Watchman implantation as a possible alternative to long-term anticoagulant therapy.  We will discuss this further at follow-up visits, as the decision to ablate or not, and subsequent success of said possible ablation, may alter her CVA risk moving forward.

## 2018-10-11 ENCOUNTER — TELEPHONE (OUTPATIENT)
Dept: CARDIOLOGY | Facility: CLINIC | Age: 63
End: 2018-10-11

## 2018-10-11 NOTE — TELEPHONE ENCOUNTER
Patient calling in stating that she was taking Cardizem 180 mg and flecainide, and has been out of Afib since Sunday, and last night she noticed her heart rate in the 50 while at eating supper, and is worried it is getting too low while she is sleeping.     Please advise.

## 2018-10-16 ENCOUNTER — OFFICE VISIT (OUTPATIENT)
Dept: CARDIOLOGY | Facility: CLINIC | Age: 63
End: 2018-10-16

## 2018-10-16 VITALS
OXYGEN SATURATION: 98 % | BODY MASS INDEX: 45.64 KG/M2 | RESPIRATION RATE: 18 BRPM | DIASTOLIC BLOOD PRESSURE: 80 MMHG | HEIGHT: 62 IN | SYSTOLIC BLOOD PRESSURE: 135 MMHG | WEIGHT: 248 LBS | HEART RATE: 99 BPM

## 2018-10-16 DIAGNOSIS — I10 ESSENTIAL HYPERTENSION: ICD-10-CM

## 2018-10-16 DIAGNOSIS — E66.01 MORBID OBESITY (HCC): ICD-10-CM

## 2018-10-16 DIAGNOSIS — E03.9 HYPOTHYROIDISM, UNSPECIFIED TYPE: ICD-10-CM

## 2018-10-16 DIAGNOSIS — G47.33 OBSTRUCTIVE SLEEP APNEA SYNDROME: ICD-10-CM

## 2018-10-16 DIAGNOSIS — I48.0 PAF (PAROXYSMAL ATRIAL FIBRILLATION) (HCC): Primary | ICD-10-CM

## 2018-10-16 PROCEDURE — 93000 ELECTROCARDIOGRAM COMPLETE: CPT | Performed by: NURSE PRACTITIONER

## 2018-10-16 PROCEDURE — 99214 OFFICE O/P EST MOD 30 MIN: CPT | Performed by: NURSE PRACTITIONER

## 2018-10-16 NOTE — PROGRESS NOTES
"    Subjective:     Encounter Date:10/16/2018      Patient ID: Nia Barker is a 63 y.o. female.    Chief Complaint:  The patient reports she is feeling poorly overall. She reports she felt herself go back into afib this morning around 630 am. She has dyspnea on exertion, fatigue, and a chest ache associated with her afib. She reports she feels as though he was maintaining a NSR for several days, leading up to this morning. Her last episode of afib was approximately 4-7 days ago, prior to this one. She denies syncope, pre syncope, orthopnea, PND, or sudden weight gain. She tells me she is compliant wit her diltiazem, flecainide, and Xarelto, but she is only taking her toprol xl PRN when she is in afib and her heart rate is up around 120. She is also concerned about asymptomatic bradycardia. She reports her HR is upper 40s-50s at rest sometimes when she is in NSR.         The following portions of the patient's history were reviewed and updated as appropriate: allergies, current medications, past family history, past medical history, past social history, past surgical history and problem list.  /80 (BP Location: Right arm, Patient Position: Sitting, Cuff Size: Adult)   Pulse 99   Resp 18   Ht 157.5 cm (62\")   Wt 112 kg (248 lb)   SpO2 98%   Breastfeeding? No   BMI 45.36 kg/m²   No Known Allergies    Current Outpatient Prescriptions:   •  B Complex Vitamins (VITAMIN B COMPLEX) capsule capsule, Take  by mouth Daily., Disp: , Rfl:   •  diltiaZEM CD (CARDIZEM CD) 180 MG 24 hr capsule, Take 180 mg by mouth Daily., Disp: , Rfl:   •  flecainide (TAMBOCOR) 50 MG tablet, Take 1 tablet by mouth 2 (Two) Times a Day., Disp: 60 tablet, Rfl: 11  •  FLUoxetine (PROzac) 20 MG capsule, Take 20 mg by mouth Daily. Take 1 capsule in the morning orally once a day, Disp: , Rfl:   •  GLUCOSAMINE-CHONDROITIN PO, Take  by mouth Daily., Disp: , Rfl:   •  levothyroxine (SYNTHROID, LEVOTHROID) 150 MCG tablet, Take 150 mcg by " mouth Daily., Disp: , Rfl:   •  losartan-hydrochlorothiazide (HYZAAR) 100-25 MG per tablet, Take 1 tablet by mouth Daily. Take 1 table orally once a day, Disp: , Rfl:   •  Magnesium Hydroxide (MAGNESIA PO), Take  by mouth Daily., Disp: , Rfl:   •  METFORMIN HCL PO, Take 500 mg by mouth 2 (Two) Times a Day. Take 1 tablet by mouth twice a day with meals, Disp: , Rfl:   •  metoprolol succinate XL (TOPROL XL) 25 MG 24 hr tablet, Take 25 mg by mouth Daily As Needed. Take 1 tablet orally once a day prn for heart palpitations. , Disp: , Rfl:   •  MULTIPLE VITAMIN PO, Take  by mouth Daily., Disp: , Rfl:   •  omeprazole (priLOSEC) 40 MG capsule, Take 40 mg by mouth As Needed. Take 1 capsule by mouth once a day , Disp: , Rfl:   •  rivaroxaban (XARELTO) 20 MG tablet, Take 20 mg by mouth Daily., Disp: , Rfl:   •  Zinc 25 MG tablet, Take  by mouth Daily., Disp: , Rfl:   Past Medical History:   Diagnosis Date   • Atrial fibrillation (CMS/HCC)    • Coronary artery disease    • Diabetes mellitus (CMS/HCC)    • Disease of thyroid gland    • Hypertension    • Indigestion    • Morbid obesity (CMS/HCC)    • Sleep apnea    • Spinal headache    • Varicose veins of leg with pain      Past Surgical History:   Procedure Laterality Date   •  SECTION     • DILATION AND CURETTAGE, DIAGNOSTIC / THERAPEUTIC  2004    emergency   • EYE SURGERY     • GALLBLADDER SURGERY      removal   • SKIN LESION EXCISION  01/2016    x2   • TUBAL ABDOMINAL LIGATION     • VARICOSE VEIN SURGERY Right 2018    Procedure: Radiofrequency ablation of the right lower extremity;  Surgeon: Melchor Jacob DO;  Location: [place] [address];  Service: Vascular     Social History     Social History   • Marital status:      Spouse name: N/A   • Number of children: N/A   • Years of education: N/A     Occupational History   • Not on file.     Social History Main Topics   • Smoking status: Former Smoker     Quit date:    • Smokeless tobacco:  Never Used   • Alcohol use Yes      Comment: ocassional   • Drug use: No   • Sexual activity: Defer     Other Topics Concern   • Not on file     Social History Narrative   • No narrative on file     Family History   Problem Relation Age of Onset   • Diabetes Mother    • Arthritis Mother    • Stroke Mother    • Osteoporosis Mother    • Hypertension Mother    • Hypertension Father    • Diabetes Father    • Arthritis Father    • Stroke Father    • Cancer Father        Review of Systems   Constitution: Positive for malaise/fatigue. Negative for chills, diaphoresis, fever and weakness.   HENT: Negative for nosebleeds.    Eyes: Negative for visual disturbance.   Cardiovascular: Positive for dyspnea on exertion. Negative for chest pain, claudication, cyanosis, irregular heartbeat, leg swelling, near-syncope, orthopnea, palpitations, paroxysmal nocturnal dyspnea and syncope.   Respiratory: Negative for cough, hemoptysis, shortness of breath, sputum production and wheezing.    Hematologic/Lymphatic: Negative for bleeding problem.   Skin: Negative for color change and flushing.   Musculoskeletal: Negative for falls.   Gastrointestinal: Negative for bloating, abdominal pain, hematemesis, hematochezia, melena, nausea and vomiting.   Genitourinary: Negative for hematuria.   Neurological: Negative for dizziness and light-headedness.   Psychiatric/Behavioral: Negative for altered mental status.         ECG 12 Lead  Date/Time: 10/16/2018 5:53 PM  Performed by: AROLDO MILLARD  Authorized by: AROLDO MILLARD   Comparison: compared with previous ECG from 9/27/2018  Comparison to previous ECG: Atrial flutter replaces NSR   Rhythm: atrial flutter               Objective:     Physical Exam   Constitutional: She is oriented to person, place, and time. She appears well-developed and well-nourished. No distress.   HENT:   Head: Normocephalic and atraumatic.   Eyes: Pupils are equal, round, and reactive to light.   Neck: Normal range of motion.  Neck supple. No JVD present. No thyromegaly present.   Cardiovascular: Normal rate, normal heart sounds and intact distal pulses.  An irregularly irregular rhythm present. Exam reveals no gallop and no friction rub.    No murmur heard.  Pulmonary/Chest: Effort normal and breath sounds normal. No respiratory distress. She has no wheezes. She has no rales. She exhibits no tenderness.   Abdominal: Soft. Bowel sounds are normal. She exhibits no distension. There is no tenderness.   Musculoskeletal: Normal range of motion. She exhibits no edema.   Neurological: She is alert and oriented to person, place, and time. No cranial nerve deficit.   Skin: Skin is warm and dry. She is not diaphoretic.   Psychiatric: She has a normal mood and affect. Her behavior is normal.       Lab Review:       Assessment:          Diagnosis Plan   1. PAF (paroxysmal atrial fibrillation and flutter) (CMS/[facility])    In atrial flutter today   Continue flecainide, cardizem, toprol xl for rate and rhythm control  Continue Xarelto for anticoagulation   Keep appt with EP, Dr. Diaz tomorrow - may need ablation   She reports intolerance to Multaq due to dyspnea (started 8/29, stopped by the pt mid September)   She was started on Flecainide 9/26 per Dr. Stevens  8/2018 14 day holter after DCCV- AF burden was determined to be 30% (while on Multaq)  8/2018 - successful KAHLIL DCCV (before multaq started)  Also of note, the patient is being considered for [facility] due to the need to take NSAIDs for arthritis and the need to be on anticoagulation for her PAF, CHADSVASC 2     2. Essential hypertension    Controlled    3. Obstructive sleep apnea syndrome    Encouraged continued compliance with CPAP   4. Hypothyroidism, unspecified type    Followed by pcp, the patient reports her TSH is now WNL after reduction in her synthroid dose by her pcp     5. Morbid obesity (CMS/[facility])  Patient's Body mass index is 45.36 kg/m². BMI is above normal parameters. Recommendations  include: exercise counseling and nutrition counseling.   8/2018 echo:    · Left ventricular systolic function is normal. Estimated EF appears to be in the range of 61 - 65%.  · Left ventricular wall thickness is consistent with mild-to-moderate concentric hypertrophy.  · Trace tricuspid valve regurgitation is present. Estimated right ventricular systolic pressure from tricuspid regurgitation is normal (<35 mmHg).     Plan:       As noted above  Keep appt with Dr. Diaz tomorrow  Continue Xarelto, flecainide, diltiazem and toprol xl.   Follow up 2-4 weeks

## 2018-10-16 NOTE — PATIENT INSTRUCTIONS
"DASH Eating Plan  DASH stands for \"Dietary Approaches to Stop Hypertension.\" The DASH eating plan is a healthy eating plan that has been shown to reduce high blood pressure (hypertension). It may also reduce your risk for type 2 diabetes, heart disease, and stroke. The DASH eating plan may also help with weight loss.  What are tips for following this plan?  General guidelines  · Avoid eating more than 2,300 mg (milligrams) of salt (sodium) a day. If you have hypertension, you may need to reduce your sodium intake to 1,500 mg a day.  · Limit alcohol intake to no more than 1 drink a day for nonpregnant women and 2 drinks a day for men. One drink equals 12 oz of beer, 5 oz of wine, or 1½ oz of hard liquor.  · Work with your health care provider to maintain a healthy body weight or to lose weight. Ask what an ideal weight is for you.  · Get at least 30 minutes of exercise that causes your heart to beat faster (aerobic exercise) most days of the week. Activities may include walking, swimming, or biking.  · Work with your health care provider or diet and nutrition specialist (dietitian) to adjust your eating plan to your individual calorie needs.  Reading food labels  · Check food labels for the amount of sodium per serving. Choose foods with less than 5 percent of the Daily Value of sodium. Generally, foods with less than 300 mg of sodium per serving fit into this eating plan.  · To find whole grains, look for the word \"whole\" as the first word in the ingredient list.  Shopping  · Buy products labeled as \"low-sodium\" or \"no salt added.\"  · Buy fresh foods. Avoid canned foods and premade or frozen meals.  Cooking  · Avoid adding salt when cooking. Use salt-free seasonings or herbs instead of table salt or sea salt. Check with your health care provider or pharmacist before using salt substitutes.  · Do not ngo foods. Cook foods using healthy methods such as baking, boiling, grilling, and broiling instead.  · Cook with " heart-healthy oils, such as olive, canola, soybean, or sunflower oil.  Meal planning    · Eat a balanced diet that includes:  ? 5 or more servings of fruits and vegetables each day. At each meal, try to fill half of your plate with fruits and vegetables.  ? Up to 6-8 servings of whole grains each day.  ? Less than 6 oz of lean meat, poultry, or fish each day. A 3-oz serving of meat is about the same size as a deck of cards. One egg equals 1 oz.  ? 2 servings of low-fat dairy each day.  ? A serving of nuts, seeds, or beans 5 times each week.  ? Heart-healthy fats. Healthy fats called Omega-3 fatty acids are found in foods such as flaxseeds and coldwater fish, like sardines, salmon, and mackerel.  · Limit how much you eat of the following:  ? Canned or prepackaged foods.  ? Food that is high in trans fat, such as fried foods.  ? Food that is high in saturated fat, such as fatty meat.  ? Sweets, desserts, sugary drinks, and other foods with added sugar.  ? Full-fat dairy products.  · Do not salt foods before eating.  · Try to eat at least 2 vegetarian meals each week.  · Eat more home-cooked food and less restaurant, buffet, and fast food.  · When eating at a restaurant, ask that your food be prepared with less salt or no salt, if possible.  What foods are recommended?  The items listed may not be a complete list. Talk with your dietitian about what dietary choices are best for you.  Grains  Whole-grain or whole-wheat bread. Whole-grain or whole-wheat pasta. Brown rice. Oatmeal. Quinoa. Bulgur. Whole-grain and low-sodium cereals. Greer bread. Low-fat, low-sodium crackers. Whole-wheat flour tortillas.  Vegetables  Fresh or frozen vegetables (raw, steamed, roasted, or grilled). Low-sodium or reduced-sodium tomato and vegetable juice. Low-sodium or reduced-sodium tomato sauce and tomato paste. Low-sodium or reduced-sodium canned vegetables.  Fruits  All fresh, dried, or frozen fruit. Canned fruit in natural juice (without  added sugar).  Meat and other protein foods  Skinless chicken or turkey. Ground chicken or turkey. Pork with fat trimmed off. Fish and seafood. Egg whites. Dried beans, peas, or lentils. Unsalted nuts, nut butters, and seeds. Unsalted canned beans. Lean cuts of beef with fat trimmed off. Low-sodium, lean deli meat.  Dairy  Low-fat (1%) or fat-free (skim) milk. Fat-free, low-fat, or reduced-fat cheeses. Nonfat, low-sodium ricotta or cottage cheese. Low-fat or nonfat yogurt. Low-fat, low-sodium cheese.  Fats and oils  Soft margarine without trans fats. Vegetable oil. Low-fat, reduced-fat, or light mayonnaise and salad dressings (reduced-sodium). Canola, safflower, olive, soybean, and sunflower oils. Avocado.  Seasoning and other foods  Herbs. Spices. Seasoning mixes without salt. Unsalted popcorn and pretzels. Fat-free sweets.  What foods are not recommended?  The items listed may not be a complete list. Talk with your dietitian about what dietary choices are best for you.  Grains  Baked goods made with fat, such as croissants, muffins, or some breads. Dry pasta or rice meal packs.  Vegetables  Creamed or fried vegetables. Vegetables in a cheese sauce. Regular canned vegetables (not low-sodium or reduced-sodium). Regular canned tomato sauce and paste (not low-sodium or reduced-sodium). Regular tomato and vegetable juice (not low-sodium or reduced-sodium). Pickles. Olives.  Fruits  Canned fruit in a light or heavy syrup. Fried fruit. Fruit in cream or butter sauce.  Meat and other protein foods  Fatty cuts of meat. Ribs. Fried meat. Tong. Sausage. Bologna and other processed lunch meats. Salami. Fatback. Hotdogs. Bratwurst. Salted nuts and seeds. Canned beans with added salt. Canned or smoked fish. Whole eggs or egg yolks. Chicken or turkey with skin.  Dairy  Whole or 2% milk, cream, and half-and-half. Whole or full-fat cream cheese. Whole-fat or sweetened yogurt. Full-fat cheese. Nondairy creamers. Whipped toppings.  Processed cheese and cheese spreads.  Fats and oils  Butter. Stick margarine. Lard. Shortening. Ghee. Tong fat. Tropical oils, such as coconut, palm kernel, or palm oil.  Seasoning and other foods  Salted popcorn and pretzels. Onion salt, garlic salt, seasoned salt, table salt, and sea salt. Worcestershire sauce. Tartar sauce. Barbecue sauce. Teriyaki sauce. Soy sauce, including reduced-sodium. Steak sauce. Canned and packaged gravies. Fish sauce. Oyster sauce. Cocktail sauce. Horseradish that you find on the shelf. Ketchup. Mustard. Meat flavorings and tenderizers. Bouillon cubes. Hot sauce and Tabasco sauce. Premade or packaged marinades. Premade or packaged taco seasonings. Relishes. Regular salad dressings.  Where to find more information:  · National Heart, Lung, and [employer]: www.nhlbi.nih.gov  · American Heart Association: www.heart.org  Summary  · The DASH eating plan is a healthy eating plan that has been shown to reduce high blood pressure (hypertension). It may also reduce your risk for type 2 diabetes, heart disease, and stroke.  · With the DASH eating plan, you should limit salt (sodium) intake to 2,300 mg a day. If you have hypertension, you may need to reduce your sodium intake to 1,500 mg a day.  · When on the DASH eating plan, aim to eat more fresh fruits and vegetables, whole grains, lean proteins, low-fat dairy, and heart-healthy fats.  · Work with your health care provider or diet and nutrition specialist (dietitian) to adjust your eating plan to your individual calorie needs.  This information is not intended to replace advice given to you by your health care provider. Make sure you discuss any questions you have with your health care provider.  Document Released: 12/06/2012 Document Revised: 12/11/2017 Document Reviewed: 12/11/2017  [employer] Interactive Patient Education © 2018 [employer]

## 2018-10-22 ENCOUNTER — TELEPHONE (OUTPATIENT)
Dept: CARDIOLOGY | Facility: CLINIC | Age: 63
End: 2018-10-22

## 2018-10-22 NOTE — TELEPHONE ENCOUNTER
Patient calling in stating that she is wanting to do the cardioversion with dr perez, states she has been in afib non stop since last Tuesday.

## 2018-10-22 NOTE — TELEPHONE ENCOUNTER
Ok - I'd like to see the note from the visit she had last week with Dr. Diaz first if possible. Can you contact his office to have them fax us a copy, and once I've reviewed it (to know if any med changes were and what his plan was for her) we can let her know about a possible cardioversion here. Thanks!

## 2018-10-23 NOTE — TELEPHONE ENCOUNTER
Patient told me she had them fax it to us, so as soon as I can get someone to get in there and try to find it for you, I will.

## 2018-11-01 DIAGNOSIS — I48.0 PAF (PAROXYSMAL ATRIAL FIBRILLATION) (HCC): Primary | ICD-10-CM

## 2018-11-02 ENCOUNTER — ANESTHESIA (OUTPATIENT)
Dept: CARDIOLOGY | Facility: HOSPITAL | Age: 63
End: 2018-11-02

## 2018-11-02 ENCOUNTER — HOSPITAL ENCOUNTER (OUTPATIENT)
Dept: CARDIOLOGY | Facility: HOSPITAL | Age: 63
Discharge: HOME | End: 2018-11-02
Attending: INTERNAL MEDICINE | Admitting: INTERNAL MEDICINE

## 2018-11-02 ENCOUNTER — ANESTHESIA EVENT (OUTPATIENT)
Dept: CARDIOLOGY | Facility: HOSPITAL | Age: 63
End: 2018-11-02

## 2018-11-02 VITALS — SYSTOLIC BLOOD PRESSURE: 126 MMHG | DIASTOLIC BLOOD PRESSURE: 74 MMHG

## 2018-11-02 VITALS
HEART RATE: 73 BPM | DIASTOLIC BLOOD PRESSURE: 87 MMHG | OXYGEN SATURATION: 96 % | TEMPERATURE: 98.6 F | RESPIRATION RATE: 16 BRPM | SYSTOLIC BLOOD PRESSURE: 131 MMHG

## 2018-11-02 DIAGNOSIS — I48.0 PAF (PAROXYSMAL ATRIAL FIBRILLATION) (HCC): ICD-10-CM

## 2018-11-02 PROCEDURE — 93010 ELECTROCARDIOGRAM REPORT: CPT | Performed by: INTERNAL MEDICINE

## 2018-11-02 PROCEDURE — 93005 ELECTROCARDIOGRAM TRACING: CPT | Performed by: INTERNAL MEDICINE

## 2018-11-02 PROCEDURE — 25010000002 PROPOFOL 10 MG/ML EMULSION: Performed by: NURSE ANESTHETIST, CERTIFIED REGISTERED

## 2018-11-02 PROCEDURE — 92960 CARDIOVERSION ELECTRIC EXT: CPT

## 2018-11-02 PROCEDURE — 92960 CARDIOVERSION ELECTRIC EXT: CPT | Performed by: INTERNAL MEDICINE

## 2018-11-02 RX ORDER — SODIUM CHLORIDE 0.9 % (FLUSH) 0.9 %
3-10 SYRINGE (ML) INJECTION AS NEEDED
Status: CANCELLED | OUTPATIENT
Start: 2018-11-02

## 2018-11-02 RX ORDER — SODIUM CHLORIDE 9 MG/ML
50 INJECTION, SOLUTION INTRAVENOUS CONTINUOUS
Status: DISCONTINUED | OUTPATIENT
Start: 2018-11-02 | End: 2018-11-03 | Stop reason: HOSPADM

## 2018-11-02 RX ORDER — SODIUM CHLORIDE 9 MG/ML
100 INJECTION, SOLUTION INTRAVENOUS CONTINUOUS
Status: CANCELLED | OUTPATIENT
Start: 2018-11-02

## 2018-11-02 RX ORDER — PROPOFOL 10 MG/ML
VIAL (ML) INTRAVENOUS AS NEEDED
Status: DISCONTINUED | OUTPATIENT
Start: 2018-11-02 | End: 2018-11-02 | Stop reason: SURG

## 2018-11-02 RX ORDER — LIDOCAINE HYDROCHLORIDE 20 MG/ML
INJECTION, SOLUTION INFILTRATION; PERINEURAL AS NEEDED
Status: DISCONTINUED | OUTPATIENT
Start: 2018-11-02 | End: 2018-11-02 | Stop reason: SURG

## 2018-11-02 RX ORDER — SODIUM CHLORIDE 0.9 % (FLUSH) 0.9 %
3 SYRINGE (ML) INJECTION EVERY 12 HOURS SCHEDULED
Status: CANCELLED | OUTPATIENT
Start: 2018-11-02

## 2018-11-02 RX ADMIN — LIDOCAINE HYDROCHLORIDE 100 MG: 20 INJECTION, SOLUTION INFILTRATION; PERINEURAL at 10:53

## 2018-11-02 RX ADMIN — PROPOFOL 70 MG: 10 INJECTION, EMULSION INTRAVENOUS at 10:53

## 2018-11-02 RX ADMIN — PROPOFOL 50 MG: 10 INJECTION, EMULSION INTRAVENOUS at 11:02

## 2018-11-02 RX ADMIN — PROPOFOL 30 MG: 10 INJECTION, EMULSION INTRAVENOUS at 10:58

## 2018-11-02 RX ADMIN — SODIUM CHLORIDE 50 ML/HR: 9 INJECTION, SOLUTION INTRAVENOUS at 10:10

## 2018-11-02 NOTE — ANESTHESIA POSTPROCEDURE EVALUATION
Patient: Nia Barker    Procedure Summary     Date:  11/02/18 Room / Location:  [facility] CLOSE OBSERVATION UNIT    Anesthesia Start:  1052 Anesthesia Stop:  1108    Procedure:  CARDIOVERSION EXTERNAL IN CARDIOLOGY DEPARTMENT Diagnosis:       PAF (paroxysmal atrial fibrillation) (CMS/HCC)      (symptomatic af)    Scheduled Providers:  Peter Stevens MD Provider:  OLU Graves CRNA    Anesthesia Type:  MAC ASA Status:  3          Anesthesia Type: MAC  Last vitals  BP   131/87 (11/02/18 1156)   Temp   98.6 °F (37 °C) (11/02/18 1000)   Pulse   73 (11/02/18 1156)   Resp   16 (11/02/18 1156)     SpO2   96 % (11/02/18 1156)     Post Anesthesia Care and Evaluation    Patient location during evaluation: PACU  Patient participation: complete - patient participated  Level of consciousness: awake and alert  Pain score: 0  Pain management: adequate  Airway patency: patent  Anesthetic complications: No anesthetic complications    Cardiovascular status: acceptable and stable  Respiratory status: acceptable and unassisted  Hydration status: acceptable

## 2018-11-02 NOTE — ANESTHESIA PREPROCEDURE EVALUATION
Anesthesia Evaluation     NPO Solid Status: > 8 hours  NPO Liquid Status: > 8 hours           Airway   Mallampati: II  Neck ROM: full  No difficulty expected  Dental      Pulmonary - normal exam   (+) sleep apnea on CPAP,   Cardiovascular - normal exam    (+) hypertension well controlled, dysrhythmias Atrial Fib, PVD,       Neuro/Psych  (+) headaches,     GI/Hepatic/Renal/Endo    (+) obesity,   diabetes mellitus well controlled, hypothyroidism,     Musculoskeletal     Abdominal  - normal exam   Substance History      OB/GYN          Other                        Anesthesia Plan    ASA 3     MAC     intravenous induction   Anesthetic plan, all risks, benefits, and alternatives have been provided, discussed and informed consent has been obtained with: patient.

## 2018-11-08 ENCOUNTER — OFFICE VISIT (OUTPATIENT)
Dept: CARDIOLOGY | Facility: CLINIC | Age: 63
End: 2018-11-08

## 2018-11-08 VITALS
HEIGHT: 62 IN | DIASTOLIC BLOOD PRESSURE: 74 MMHG | HEART RATE: 98 BPM | SYSTOLIC BLOOD PRESSURE: 133 MMHG | BODY MASS INDEX: 45.82 KG/M2 | WEIGHT: 249 LBS

## 2018-11-08 DIAGNOSIS — I10 ESSENTIAL HYPERTENSION: ICD-10-CM

## 2018-11-08 DIAGNOSIS — I48.3 TYPICAL ATRIAL FLUTTER (HCC): ICD-10-CM

## 2018-11-08 DIAGNOSIS — I48.0 PAF (PAROXYSMAL ATRIAL FIBRILLATION) (HCC): Primary | ICD-10-CM

## 2018-11-08 PROCEDURE — 93000 ELECTROCARDIOGRAM COMPLETE: CPT | Performed by: INTERNAL MEDICINE

## 2018-11-08 PROCEDURE — 99214 OFFICE O/P EST MOD 30 MIN: CPT | Performed by: INTERNAL MEDICINE

## 2018-11-08 NOTE — PROGRESS NOTES
Subjective:     Encounter Date:11/08/2018      Patient ID: Nia Barker is a 63 y.o. female.    Chief Complaint: af f/u  Mrs. Barker is a 63 year old female whom I met during a brief hospitalization in August 2018, when she presented with palpitations and associated fatigue and dyspnea for 9 days  Upon arrival, she was noted to be in atrial fibrillation with rapid ventricular response. Ventricular rate was slowed with cardizem infusion, but despite being rate controlled, she remained fairly symptomatic with the aforementioned symptoms.  Therefore, she underwent successful KAHLIL-guided cardioversion on 8/29/18, and was discharged home on Multaq for rhythm control, Toprol XL 50 for rate control should afib recur, and Xarelto for CVA prophylaxis. She has called in multiple times since, complaining of GI upset initially that she thought was Xarelto, but then that got better. Then she called with shortness of air that she thought was from Multaq, and later called worried that her heart rate was too low and wanted to stop her Toprol.  She then called back stating she was back in AFib again (two separate times). When she was seen in office on 9/26/18, she reported that she had stopped taking Multaq about two weeks prior due to worsened nausea, shortness of breath, fatigue, and muscle weakness.  After stopping, those symptoms got better.  However, she continued to be symptomatic with palpitations and dyspnea.  That day, I started her on flecainide and scheduled elective outpatient cardioversion following day.  However, when she arrived on 9/27/18, she was back in sinus rhythm so is discharged home to continue antiarrhythmic and anticoagulant therapy.  She then return to the office and was seen by RICARDO Espitia, on 10/16/18, and was symptomatically that time but actually in atrial flutter.  She then saw Dr. Steve Diaz, and is planned for ablation with him next week.  She called back into our office last week  saying she was very symptomatic again, and that she had been in atrial fibrillation for several days.  She was brought in for another elective cardioversion on 11/2/18-initial cardioversion took her from atrial fibrillation and atrial flutter, and another cardioversion then restored normal sinus rhythm.  She returned home in sinus rhythm that day and now returns for follow-up visit.    She reports reports she went back into AF the following day (known by recurrence of palpitations).  Overall, she reports symptoms are worsening. These include dyspnea, fatigue, and palpitations.   Of note, she reports symptoms that she perceived to be side effects from Multaq resolved after stopping it, but now is bothered by constipation that she attributes to taking the flecainide.          The following portions of the patient's history were reviewed and updated as appropriate: allergies, current medications, past family history, past medical history, past social history, past surgical history and problem list.    Review of Systems   Constitution: Positive for malaise/fatigue.   Cardiovascular: Positive for dyspnea on exertion. Negative for chest pain, claudication, leg swelling, near-syncope, orthopnea, palpitations, paroxysmal nocturnal dyspnea and syncope.   Respiratory: Negative for shortness of breath.    Hematologic/Lymphatic: Does not bruise/bleed easily.   Gastrointestinal: Positive for constipation (cites onset since starting flecainide).        Objective:      Vitals:    11/08/18 1055   BP: 133/74   Pulse: 98     Physical Exam   Constitutional: She is oriented to person, place, and time. She appears well-developed and well-nourished.   Neck: No JVD present.   Cardiovascular: Normal rate, normal heart sounds and intact distal pulses.  An irregularly irregular rhythm present.   No murmur heard.  Pulmonary/Chest: Effort normal and breath sounds normal.   Musculoskeletal: She exhibits no edema.   Neurological: She is alert  and oriented to person, place, and time.   Skin: Skin is warm and dry.       Lab Review:         ECG 12 Lead  Date/Time: 11/8/2018 11:20 AM  Performed by: GAMA HASSAN  Authorized by: GAMA HASSAN   Comparison: compared with previous ECG from 11/2/2018  Comparison to previous ECG: Atrial fibrillation has replaced normal sinus rhythm  Rhythm: atrial fibrillation  BPM: 98  Q waves: V2 and V1  Clinical impression: abnormal ECG          Reviewed multiple old records, including office visit note with Angie Knees on 9/26/18    Assessment/Plan:     Problem List Items Addressed This Visit        Cardiovascular and Mediastinum    Hypertension    Current Assessment & Plan     Hypertension is well controlled.  Continue current treatment regimen.  Blood pressure will be reassessed at the next regular appointment.         PAF (paroxysmal atrial fibrillation) (CMS/HCC) - Primary    Overview     Initially diagnosed 8/28/18, but reports intermittent symptoms for several years prior to this.  CHADS2-VASc=2 (HTN, female)  Successful KAHLIL-guided cardioversion 8/29/18 - felt better in NSR but started having symptomatic paroxysms within days of DCCV despite having been started on Multaq  Started on flecainide 9/26/18 - in NSR following day when arriving for planned cardioversion  Back in symptomatic AF last week of October - underwent DCCV 11/2/18 but only remained in NSR for <24 hours; symptomatic w/recurrence         Current Assessment & Plan     Failing antiarrhythmic therapy, with lifestyle-limiting symptoms when in atrial fibrillation.  Atrial fibrillation itself becoming more refractory to attempted cardioversions.  Plan for ablation next week with Dr. Diaz.  Continue Xarelto without interruption, but can discontinue flecainide since she thinks she is constipated from it and is not experiencing any benefit as she remains in atrial fibrillation.         Relevant Orders    ECG 12 Lead    Typical atrial flutter (CMS/HCC)     Overview     Seen on some ECGs, and after first cardioversion on 11/2/18 went from AFib to flutter (requiring an additional cardioversion)         Current Assessment & Plan     Planning for possible ablation of atrial fib relation and flutter next week with Dr. Diaz; continue Xarelto without interruption           F/u 6 months - most of short-term f/u will be with Dr. Diaz post-ablation    Peter Stevens MD  11/08/2018  2:37 PM

## 2018-11-09 NOTE — ASSESSMENT & PLAN NOTE
Planning for possible ablation of atrial fib relation and flutter next week with Dr. Diaz; continue Xarelto without interruption

## 2018-11-09 NOTE — ASSESSMENT & PLAN NOTE
Failing antiarrhythmic therapy, with lifestyle-limiting symptoms when in atrial fibrillation.  Atrial fibrillation itself becoming more refractory to attempted cardioversions.  Plan for ablation next week with Dr. Diaz.  Continue Xarelto without interruption, but can discontinue flecainide since she thinks she is constipated from it and is not experiencing any benefit as she remains in atrial fibrillation.

## 2018-11-26 ENCOUNTER — TELEPHONE (OUTPATIENT)
Dept: VASCULAR SURGERY | Facility: CLINIC | Age: 63
End: 2018-11-26

## 2018-11-26 NOTE — TELEPHONE ENCOUNTER
Nigel with [employer] called and request we send in a retro authorization for dos 6/11/18. I faxed in request.

## 2018-12-14 ENCOUNTER — TELEPHONE (OUTPATIENT)
Dept: VASCULAR SURGERY | Facility: CLINIC | Age: 63
End: 2018-12-14

## 2018-12-14 NOTE — TELEPHONE ENCOUNTER
Patient called in to cancel her December appt.  She said she didn't want to reschedule at this time.

## 2019-01-10 ENCOUNTER — TELEPHONE (OUTPATIENT)
Dept: VASCULAR SURGERY | Facility: CLINIC | Age: 64
End: 2019-01-10

## 2019-01-10 NOTE — TELEPHONE ENCOUNTER
Patient had procedure on 6/11/2018.  Auth that was obtained was for 6/20/2018 - 9/20/2018.  Contacted insurance company and they retro the dates to 6/11/2018 - 9/11/2018.  Contacted claims at [facility] and advised it had been completed.

## 2019-04-30 ENCOUNTER — OFFICE VISIT (OUTPATIENT)
Dept: CARDIOLOGY | Facility: CLINIC | Age: 64
End: 2019-04-30

## 2019-04-30 VITALS
BODY MASS INDEX: 45.08 KG/M2 | HEIGHT: 62 IN | WEIGHT: 245 LBS | DIASTOLIC BLOOD PRESSURE: 60 MMHG | HEART RATE: 100 BPM | SYSTOLIC BLOOD PRESSURE: 129 MMHG

## 2019-04-30 DIAGNOSIS — I48.3 TYPICAL ATRIAL FLUTTER (HCC): ICD-10-CM

## 2019-04-30 DIAGNOSIS — I10 ESSENTIAL HYPERTENSION: ICD-10-CM

## 2019-04-30 DIAGNOSIS — I48.0 PAF (PAROXYSMAL ATRIAL FIBRILLATION) (HCC): Primary | ICD-10-CM

## 2019-04-30 PROCEDURE — 99214 OFFICE O/P EST MOD 30 MIN: CPT | Performed by: INTERNAL MEDICINE

## 2019-04-30 PROCEDURE — 93000 ELECTROCARDIOGRAM COMPLETE: CPT | Performed by: INTERNAL MEDICINE

## 2019-04-30 RX ORDER — MELOXICAM 15 MG/1
15 TABLET ORAL DAILY
COMMUNITY

## 2019-04-30 RX ORDER — LEVOTHYROXINE SODIUM 175 UG/1
175 TABLET ORAL DAILY
COMMUNITY

## 2021-08-23 PROCEDURE — 87070 CULTURE OTHR SPECIMN AEROBIC: CPT | Performed by: NURSE PRACTITIONER

## 2021-08-23 PROCEDURE — 87205 SMEAR GRAM STAIN: CPT | Performed by: NURSE PRACTITIONER

## 2023-05-08 ENCOUNTER — OFFICE VISIT (OUTPATIENT)
Dept: GASTROENTEROLOGY | Facility: CLINIC | Age: 68
End: 2023-05-08
Payer: MEDICARE

## 2023-05-08 VITALS
DIASTOLIC BLOOD PRESSURE: 80 MMHG | SYSTOLIC BLOOD PRESSURE: 128 MMHG | WEIGHT: 234 LBS | TEMPERATURE: 98 F | BODY MASS INDEX: 43.06 KG/M2 | HEIGHT: 62 IN | HEART RATE: 74 BPM | OXYGEN SATURATION: 97 %

## 2023-05-08 DIAGNOSIS — Z12.11 ENCOUNTER FOR SCREENING FOR MALIGNANT NEOPLASM OF COLON: Primary | ICD-10-CM

## 2023-05-08 PROCEDURE — 3074F SYST BP LT 130 MM HG: CPT | Performed by: NURSE PRACTITIONER

## 2023-05-08 PROCEDURE — 3079F DIAST BP 80-89 MM HG: CPT | Performed by: NURSE PRACTITIONER

## 2023-05-08 PROCEDURE — 1160F RVW MEDS BY RX/DR IN RCRD: CPT | Performed by: NURSE PRACTITIONER

## 2023-05-08 PROCEDURE — S0260 H&P FOR SURGERY: HCPCS | Performed by: NURSE PRACTITIONER

## 2023-05-08 PROCEDURE — 1159F MED LIST DOCD IN RCRD: CPT | Performed by: NURSE PRACTITIONER

## 2023-05-08 NOTE — H&P (VIEW-ONLY)
Chief Complaint   Patient presents with   • Colonoscopy     08 had coon 5 year recall is having problems with bowels when she has  to have bm she has to go right then       PCP: Yohan Lockhart MD  REFER: No ref. provider found    Subjective     HPI    Nia Barker is a 67 y.o. female who presents to office for preventative maintenance.  There is not  a personal history of colon polyps.  There is not a history of colon cancer.  She does not have complaints of nausea/vomiting, change in bowels, weight loss, no BRBPR, no melena.  There is a questionable family history of colon cancer in first degree relative-father.  There is not a family history of colon polyps in first degree relative.  Nia Barker last colonoscopy- .  Bowels do move on regular basis.  She has apx 2 days a week with diarrhea, the other days she does pass stool.  She reports 2-3 bowel movement per day.  She has a history of IBS with Diarrhea.  She will have occasional abdominal pain but states this isn't any worse than normal.     SCANNED - COLONOSCOPY (2008 00:00)      No results for input(s): GLUCOSE, NA, K, CREATININE, BUN, BCR, ALKPHOS, ALT, AST, BILITOT, ALBUMIN in the last 71735 hours.    No results for input(s): EGFRIFNONA, EGFRIFAFRI, EGFRRESULT in the last 54478 hours.     Past Medical History:   Diagnosis Date   • Atrial fibrillation    • Coronary artery disease    • Diabetes mellitus    • Disease of thyroid gland    • Hypertension    • Indigestion    • Morbid obesity    • Sleep apnea    • Spinal headache    • Varicose veins of leg with pain      Past Surgical History:   Procedure Laterality Date   • CARDIAC ABLATION     •  SECTION     • DILATION AND CURETTAGE, DIAGNOSTIC / THERAPEUTIC  2004    emergency   • EYE SURGERY     • GALLBLADDER SURGERY      removal   • SKIN LESION EXCISION  01/2016    x2   • TUBAL ABDOMINAL LIGATION     • VARICOSE VEIN SURGERY Right 2018    Procedure:  Radiofrequency ablation of the right lower extremity;  Surgeon: Melchor Jacob DO;  Location: [facility] HYBRID OR 12;  Service: Vascular     Outpatient Medications Marked as Taking for the 23 encounter (Office Visit) with Spenser Redmond APRN   Medication Sig Dispense Refill   • B Complex Vitamins (VITAMIN B COMPLEX) capsule capsule Take  by mouth Daily.     • FLUoxetine (PROzac) 20 MG capsule Take 1 capsule by mouth Daily. Take 1 capsule in the morning orally once a day     • glucosamine-chondroitin 500-400 MG capsule capsule Take 1 capsule by mouth Daily.     • levothyroxine (SYNTHROID, LEVOTHROID) 175 MCG tablet Take 1 tablet by mouth Daily.     • Levothyroxine Sodium 150 MCG/ML solution 1 tablet Daily.     • losartan-hydrochlorothiazide (HYZAAR) 100-25 MG per tablet Take 1 tablet by mouth Daily. Take 1 table orally once a day     • Magnesium 300 MG capsule Take 1 capsule by mouth Daily.     • meloxicam (MOBIC) 15 MG tablet Take 1 tablet by mouth Daily.     • METFORMIN HCL PO Take 500 mg by mouth 2 (Two) Times a Day. Take 1 tablet by mouth twice a day with meals     • MULTIPLE VITAMIN PO Take  by mouth Daily.     • mupirocin (BACTROBAN) 2 % ointment Apply  topically to the appropriate area as directed 3 (Three) Times a Day. 22 g 0   • Omega-3 Fatty Acids (fish oil) 1000 MG capsule capsule Take 1 capsule by mouth Daily.     • omeprazole (priLOSEC) 40 MG capsule Take 1 capsule by mouth Daily. Take 1 capsule by mouth once a day     • Zinc 25 MG tablet Take  by mouth Daily.       Allergies   Allergen Reactions   • Nebivolol Hcl Unknown - High Severity     Social History     Socioeconomic History   • Marital status:    Tobacco Use   • Smoking status: Former     Types: Cigarettes     Quit date:      Years since quittin.3   • Smokeless tobacco: Never   Vaping Use   • Vaping Use: Never used   Substance and Sexual Activity   • Alcohol use: Yes     Comment: ocassional   • Drug use: No   • Sexual  activity: Defer     Review of Systems   Constitutional: Negative for unexpected weight change.   Respiratory: Negative for shortness of breath.    Cardiovascular: Negative for chest pain.   Gastrointestinal: Negative for abdominal pain and anal bleeding.     Objective   Vitals:    05/08/23 1336   BP: 128/80   Pulse: 74   Temp: 98 °F (36.7 °C)   SpO2: 97%     Physical Exam  Constitutional:       Appearance: Normal appearance. She is well-developed.   Eyes:      General: No scleral icterus.  Cardiovascular:      Rate and Rhythm: Regular rhythm.      Heart sounds: Normal heart sounds. No murmur heard.  Pulmonary:      Effort: Pulmonary effort is normal. No accessory muscle usage.      Breath sounds: Normal breath sounds.   Abdominal:      General: Bowel sounds are normal. There is no distension.      Palpations: Abdomen is soft. There is no mass.      Tenderness: There is no abdominal tenderness. There is no guarding or rebound.   Skin:     General: Skin is warm and dry.      Coloration: Skin is not jaundiced.   Neurological:      Mental Status: She is alert.   Psychiatric:         Behavior: Behavior is cooperative.       Imaging Results (Most Recent)     None        Body mass index is 42.8 kg/m².    Assessment & Plan   Diagnoses and all orders for this visit:    1. Encounter for screening for malignant neoplasm of colon (Primary)  -     Case Request; Standing  -     Implement Anesthesia Orders Day of Procedure; Standing  -     Obtain Informed Consent; Standing  -     Case Request      COLONOSCOPY WITH ANESTHESIA (N/A)    Miralax prep      Advised pt to stop use of NSAIDs, Fish Oil, and MV 5 days prior to procedure, per Dr Perdomo protocol.  Tylenol based products are ok to take.  Pt verbalized understanding.     All risks, benefits, alternatives, and indications of colonoscopy procedure have been discussed with the patient. Risks to include perforation of the colon requiring possible surgery or colostomy, risk of  bleeding from biopsies or removal of colon tissue, possibility of missing a colon polyp or cancer, or adverse drug reaction.  Benefits to include the diagnosis and management of disease of the colon and rectum. Alternatives to include barium enema, radiographic evaluation, lab testing or no intervention. She verbalizes understanding and agrees.         Spenser Redmond, APRN  05/08/23        There are no Patient Instructions on file for this visit.

## 2023-05-08 NOTE — PROGRESS NOTES
Chief Complaint   Patient presents with   • Colonoscopy     08 had coon 5 year recall is having problems with bowels when she has  to have bm she has to go right then       PCP: Yohan Lockhart MD  REFER: No ref. provider found    Subjective     HPI    Nia Barker is a 67 y.o. female who presents to office for preventative maintenance.  There is not  a personal history of colon polyps.  There is not a history of colon cancer.  She does not have complaints of nausea/vomiting, change in bowels, weight loss, no BRBPR, no melena.  There is a questionable family history of colon cancer in first degree relative-father.  There is not a family history of colon polyps in first degree relative.  Nia Barker last colonoscopy- .  Bowels do move on regular basis.  She has apx 2 days a week with diarrhea, the other days she does pass stool.  She reports 2-3 bowel movement per day.  She has a history of IBS with Diarrhea.  She will have occasional abdominal pain but states this isn't any worse than normal.     SCANNED - COLONOSCOPY (2008 00:00)      No results for input(s): GLUCOSE, NA, K, CREATININE, BUN, BCR, ALKPHOS, ALT, AST, BILITOT, ALBUMIN in the last 24368 hours.    No results for input(s): EGFRIFNONA, EGFRIFAFRI, EGFRRESULT in the last 56936 hours.     Past Medical History:   Diagnosis Date   • Atrial fibrillation    • Coronary artery disease    • Diabetes mellitus    • Disease of thyroid gland    • Hypertension    • Indigestion    • Morbid obesity    • Sleep apnea    • Spinal headache    • Varicose veins of leg with pain      Past Surgical History:   Procedure Laterality Date   • CARDIAC ABLATION     •  SECTION     • DILATION AND CURETTAGE, DIAGNOSTIC / THERAPEUTIC  2004    emergency   • EYE SURGERY     • GALLBLADDER SURGERY      removal   • SKIN LESION EXCISION  01/2016    x2   • TUBAL ABDOMINAL LIGATION     • VARICOSE VEIN SURGERY Right 2018    Procedure:  Radiofrequency ablation of the right lower extremity;  Surgeon: Melchor Jacob DO;  Location: [facility] HYBRID OR 12;  Service: Vascular     Outpatient Medications Marked as Taking for the 23 encounter (Office Visit) with Spenser Redmond APRN   Medication Sig Dispense Refill   • B Complex Vitamins (VITAMIN B COMPLEX) capsule capsule Take  by mouth Daily.     • FLUoxetine (PROzac) 20 MG capsule Take 1 capsule by mouth Daily. Take 1 capsule in the morning orally once a day     • glucosamine-chondroitin 500-400 MG capsule capsule Take 1 capsule by mouth Daily.     • levothyroxine (SYNTHROID, LEVOTHROID) 175 MCG tablet Take 1 tablet by mouth Daily.     • Levothyroxine Sodium 150 MCG/ML solution 1 tablet Daily.     • losartan-hydrochlorothiazide (HYZAAR) 100-25 MG per tablet Take 1 tablet by mouth Daily. Take 1 table orally once a day     • Magnesium 300 MG capsule Take 1 capsule by mouth Daily.     • meloxicam (MOBIC) 15 MG tablet Take 1 tablet by mouth Daily.     • METFORMIN HCL PO Take 500 mg by mouth 2 (Two) Times a Day. Take 1 tablet by mouth twice a day with meals     • MULTIPLE VITAMIN PO Take  by mouth Daily.     • mupirocin (BACTROBAN) 2 % ointment Apply  topically to the appropriate area as directed 3 (Three) Times a Day. 22 g 0   • Omega-3 Fatty Acids (fish oil) 1000 MG capsule capsule Take 1 capsule by mouth Daily.     • omeprazole (priLOSEC) 40 MG capsule Take 1 capsule by mouth Daily. Take 1 capsule by mouth once a day     • Zinc 25 MG tablet Take  by mouth Daily.       Allergies   Allergen Reactions   • Nebivolol Hcl Unknown - High Severity     Social History     Socioeconomic History   • Marital status:    Tobacco Use   • Smoking status: Former     Types: Cigarettes     Quit date:      Years since quittin.3   • Smokeless tobacco: Never   Vaping Use   • Vaping Use: Never used   Substance and Sexual Activity   • Alcohol use: Yes     Comment: ocassional   • Drug use: No   • Sexual  activity: Defer     Review of Systems   Constitutional: Negative for unexpected weight change.   Respiratory: Negative for shortness of breath.    Cardiovascular: Negative for chest pain.   Gastrointestinal: Negative for abdominal pain and anal bleeding.     Objective   Vitals:    05/08/23 1336   BP: 128/80   Pulse: 74   Temp: 98 °F (36.7 °C)   SpO2: 97%     Physical Exam  Constitutional:       Appearance: Normal appearance. She is well-developed.   Eyes:      General: No scleral icterus.  Cardiovascular:      Rate and Rhythm: Regular rhythm.      Heart sounds: Normal heart sounds. No murmur heard.  Pulmonary:      Effort: Pulmonary effort is normal. No accessory muscle usage.      Breath sounds: Normal breath sounds.   Abdominal:      General: Bowel sounds are normal. There is no distension.      Palpations: Abdomen is soft. There is no mass.      Tenderness: There is no abdominal tenderness. There is no guarding or rebound.   Skin:     General: Skin is warm and dry.      Coloration: Skin is not jaundiced.   Neurological:      Mental Status: She is alert.   Psychiatric:         Behavior: Behavior is cooperative.       Imaging Results (Most Recent)     None        Body mass index is 42.8 kg/m².    Assessment & Plan   Diagnoses and all orders for this visit:    1. Encounter for screening for malignant neoplasm of colon (Primary)  -     Case Request; Standing  -     Implement Anesthesia Orders Day of Procedure; Standing  -     Obtain Informed Consent; Standing  -     Case Request      COLONOSCOPY WITH ANESTHESIA (N/A)    Miralax prep      Advised pt to stop use of NSAIDs, Fish Oil, and MV 5 days prior to procedure, per Dr Perdomo protocol.  Tylenol based products are ok to take.  Pt verbalized understanding.     All risks, benefits, alternatives, and indications of colonoscopy procedure have been discussed with the patient. Risks to include perforation of the colon requiring possible surgery or colostomy, risk of  bleeding from biopsies or removal of colon tissue, possibility of missing a colon polyp or cancer, or adverse drug reaction.  Benefits to include the diagnosis and management of disease of the colon and rectum. Alternatives to include barium enema, radiographic evaluation, lab testing or no intervention. She verbalizes understanding and agrees.         Spenser Redmond, APRN  05/08/23        There are no Patient Instructions on file for this visit.

## 2023-06-05 ENCOUNTER — ANESTHESIA EVENT (OUTPATIENT)
Dept: GASTROENTEROLOGY | Facility: HOSPITAL | Age: 68
End: 2023-06-05
Payer: MEDICARE

## 2023-06-05 ENCOUNTER — ANESTHESIA (OUTPATIENT)
Dept: GASTROENTEROLOGY | Facility: HOSPITAL | Age: 68
End: 2023-06-05
Payer: MEDICARE

## 2023-06-05 ENCOUNTER — HOSPITAL ENCOUNTER (OUTPATIENT)
Facility: HOSPITAL | Age: 68
Setting detail: HOSPITAL OUTPATIENT SURGERY
Discharge: HOME | End: 2023-06-05
Attending: INTERNAL MEDICINE | Admitting: INTERNAL MEDICINE
Payer: MEDICARE

## 2023-06-05 VITALS
BODY MASS INDEX: 42.33 KG/M2 | HEART RATE: 71 BPM | WEIGHT: 230 LBS | SYSTOLIC BLOOD PRESSURE: 129 MMHG | DIASTOLIC BLOOD PRESSURE: 58 MMHG | RESPIRATION RATE: 17 BRPM | HEIGHT: 62 IN | OXYGEN SATURATION: 100 % | TEMPERATURE: 97.6 F

## 2023-06-05 DIAGNOSIS — Z12.11 ENCOUNTER FOR SCREENING FOR MALIGNANT NEOPLASM OF COLON: ICD-10-CM

## 2023-06-05 LAB — GLUCOSE BLDC GLUCOMTR-MCNC: 110 MG/DL (ref 70–130)

## 2023-06-05 PROCEDURE — 25010000002 PROPOFOL 10 MG/ML EMULSION: Performed by: NURSE ANESTHETIST, CERTIFIED REGISTERED

## 2023-06-05 PROCEDURE — 82948 REAGENT STRIP/BLOOD GLUCOSE: CPT

## 2023-06-05 PROCEDURE — 45380 COLONOSCOPY AND BIOPSY: CPT | Performed by: INTERNAL MEDICINE

## 2023-06-05 PROCEDURE — 88305 TISSUE EXAM BY PATHOLOGIST: CPT | Performed by: INTERNAL MEDICINE

## 2023-06-05 RX ORDER — LIDOCAINE HYDROCHLORIDE 20 MG/ML
INJECTION, SOLUTION EPIDURAL; INFILTRATION; INTRACAUDAL; PERINEURAL AS NEEDED
Status: DISCONTINUED | OUTPATIENT
Start: 2023-06-05 | End: 2023-06-05 | Stop reason: SURG

## 2023-06-05 RX ORDER — SODIUM CHLORIDE 0.9 % (FLUSH) 0.9 %
10 SYRINGE (ML) INJECTION AS NEEDED
Status: DISCONTINUED | OUTPATIENT
Start: 2023-06-05 | End: 2023-06-05 | Stop reason: HOSPADM

## 2023-06-05 RX ORDER — SODIUM CHLORIDE 9 MG/ML
500 INJECTION, SOLUTION INTRAVENOUS CONTINUOUS PRN
Status: DISCONTINUED | OUTPATIENT
Start: 2023-06-05 | End: 2023-06-05 | Stop reason: HOSPADM

## 2023-06-05 RX ORDER — PROPOFOL 10 MG/ML
VIAL (ML) INTRAVENOUS AS NEEDED
Status: DISCONTINUED | OUTPATIENT
Start: 2023-06-05 | End: 2023-06-05 | Stop reason: SURG

## 2023-06-05 RX ADMIN — PROPOFOL INJECTABLE EMULSION 200 MG: 10 INJECTION, EMULSION INTRAVENOUS at 09:09

## 2023-06-05 RX ADMIN — LIDOCAINE HYDROCHLORIDE 50 MG: 20 INJECTION, SOLUTION EPIDURAL; INFILTRATION; INTRACAUDAL; PERINEURAL at 09:09

## 2023-06-05 RX ADMIN — SODIUM CHLORIDE 500 ML: 9 INJECTION, SOLUTION INTRAVENOUS at 08:03

## 2023-06-05 NOTE — ANESTHESIA PREPROCEDURE EVALUATION
Anesthesia Evaluation     Patient summary reviewed   no history of anesthetic complications:   NPO Solid Status: > 8 hours             Airway   Mallampati: II  Neck ROM: full  No difficulty expected  Dental      Pulmonary    (+) ,sleep apnea  (-) asthma, not a smoker  Cardiovascular     (+) hypertension well controlleddysrhythmias (s/p cardioversion) Atrial Fib, PVD  (-) past MI, cardiac stents      Neuro/Psych  (+) headaches  (-) seizures, TIA, CVA  GI/Hepatic/Renal/Endo    (+) obesity, morbid obesity, diabetes mellitus well controlled, thyroid problem hypothyroidism    Musculoskeletal     Abdominal    Substance History      OB/GYN          Other                        Anesthesia Plan    ASA 3     MAC     intravenous induction     Anesthetic plan, risks, benefits, and alternatives have been provided, discussed and informed consent has been obtained with: patient.

## 2023-06-05 NOTE — ANESTHESIA POSTPROCEDURE EVALUATION
"Patient: Nia Barker    Procedure Summary       Date: 06/05/23 Room / Location: [facility] ENDOSCOPY 4 / [facility] ENDOSCOPY    Anesthesia Start: 0906 Anesthesia Stop: 0933    Procedure: COLONOSCOPY WITH ANESTHESIA Diagnosis:       Encounter for screening for malignant neoplasm of colon      (Encounter for screening for malignant neoplasm of colon [Z12.11])    Surgeons: Romel Perdomo DO Provider: Marvin Alanis CRNA    Anesthesia Type: MAC ASA Status: 3            Anesthesia Type: MAC    Vitals  Vitals Value Taken Time   /58 06/05/23 0931   Temp     Pulse 65 06/05/23 0933   Resp 13 06/05/23 0930   SpO2 97 % 06/05/23 0933   Vitals shown include unvalidated device data.        Post Anesthesia Care and Evaluation    Patient location during evaluation: PACU  Patient participation: complete - patient participated  Level of consciousness: awake and alert  Pain management: adequate    Airway patency: patent  Anesthetic complications: No anesthetic complications    Cardiovascular status: acceptable  Respiratory status: acceptable  Hydration status: acceptable    Comments: Blood pressure 139/58, pulse 66, temperature 97.6 °F (36.4 °C), temperature source Temporal, resp. rate 13, height 157.5 cm (62\"), weight 104 kg (230 lb), SpO2 98 %, not currently breastfeeding.    Pt discharged from PACU based on joe score >8    "

## 2023-06-06 ENCOUNTER — TELEPHONE (OUTPATIENT)
Dept: GASTROENTEROLOGY | Facility: CLINIC | Age: 68
End: 2023-06-06
Payer: MEDICARE

## 2023-06-06 LAB
CYTO UR: NORMAL
LAB AP CASE REPORT: NORMAL
Lab: NORMAL
PATH REPORT.FINAL DX SPEC: NORMAL
PATH REPORT.GROSS SPEC: NORMAL

## 2023-12-01 ENCOUNTER — TRANSCRIBE ORDERS (OUTPATIENT)
Dept: ADMINISTRATIVE | Facility: HOSPITAL | Age: 68
End: 2023-12-01
Payer: MEDICARE

## 2023-12-01 DIAGNOSIS — I48.91 ATRIAL FIBRILLATION, UNSPECIFIED TYPE: Primary | ICD-10-CM

## 2023-12-01 DIAGNOSIS — R53.83 DECREASED STAMINA: ICD-10-CM

## 2023-12-01 DIAGNOSIS — R06.09 DYSPNEA ON EXERTION: ICD-10-CM

## 2023-12-29 ENCOUNTER — HOSPITAL ENCOUNTER (OUTPATIENT)
Dept: CARDIOLOGY | Facility: HOSPITAL | Age: 68
Discharge: HOME | End: 2023-12-29
Payer: MEDICARE

## 2023-12-29 VITALS
WEIGHT: 238 LBS | BODY MASS INDEX: 43.79 KG/M2 | HEIGHT: 62 IN | HEART RATE: 80 BPM | SYSTOLIC BLOOD PRESSURE: 134 MMHG | DIASTOLIC BLOOD PRESSURE: 83 MMHG

## 2023-12-29 DIAGNOSIS — R53.83 DECREASED STAMINA: ICD-10-CM

## 2023-12-29 DIAGNOSIS — I48.91 ATRIAL FIBRILLATION, UNSPECIFIED TYPE: ICD-10-CM

## 2023-12-29 DIAGNOSIS — R06.09 DYSPNEA ON EXERTION: ICD-10-CM

## 2023-12-29 PROCEDURE — 93350 STRESS TTE ONLY: CPT

## 2023-12-29 PROCEDURE — 25510000001 PERFLUTREN 6.52 MG/ML SUSPENSION: Performed by: INTERNAL MEDICINE

## 2023-12-29 PROCEDURE — 93017 CV STRESS TEST TRACING ONLY: CPT

## 2023-12-29 RX ORDER — SUCRALFATE 1 G/1
1 TABLET ORAL 4 TIMES DAILY
COMMUNITY

## 2023-12-29 RX ADMIN — PERFLUTREN 8.48 MG: 6.52 INJECTION, SUSPENSION INTRAVENOUS at 10:48

## 2024-01-01 LAB
BH CV STRESS BP STAGE 1: NORMAL
BH CV STRESS DURATION MIN STAGE 1: 3
BH CV STRESS DURATION SEC STAGE 1: 57
BH CV STRESS GRADE STAGE 1: 10
BH CV STRESS HR STAGE 1: 137
BH CV STRESS METS STAGE 1: 5
BH CV STRESS PROTOCOL 1: NORMAL
BH CV STRESS RECOVERY BP: NORMAL MMHG
BH CV STRESS RECOVERY HR: 83 BPM
BH CV STRESS SPEED STAGE 1: 1.7
BH CV STRESS STAGE 1: 1
MAXIMAL PREDICTED HEART RATE: 150 BPM
PERCENT MAX PREDICTED HR: 91.33 %
STRESS BASELINE BP: NORMAL MMHG
STRESS BASELINE HR: 78 BPM
STRESS PERCENT HR: 107 %
STRESS POST ESTIMATED WORKLOAD: 5 METS
STRESS POST EXERCISE DUR MIN: 3 MIN
STRESS POST EXERCISE DUR SEC: 57 SEC
STRESS POST PEAK BP: NORMAL MMHG
STRESS POST PEAK HR: 137 BPM
STRESS TARGET HR: 128 BPM

## 2024-04-03 ENCOUNTER — HOSPITAL ENCOUNTER (OUTPATIENT)
Dept: SLEEP CENTER | Age: 69
Discharge: HOME | End: 2024-04-05
Payer: MEDICARE

## 2024-04-03 PROCEDURE — G0399 HOME SLEEP TEST/TYPE 3 PORTA: HCPCS

## 2024-04-03 NOTE — PROGRESS NOTES
Ms.Louise Lugo presented today in the sleep center for a Home Sleep Test (HST).  Ms Lugo was instructed on the device and was requested to wear the unit for two nights.  was asked to have the HST monitor back by 2PM on  04/05/2024. The patient acknowledged she understood. The HST device was tested and was in working order.

## 2024-04-04 PROCEDURE — G0399 HOME SLEEP TEST/TYPE 3 PORTA: HCPCS

## 2024-04-19 DIAGNOSIS — G47.33 OSA (OBSTRUCTIVE SLEEP APNEA): Primary | ICD-10-CM

## 2024-08-08 ENCOUNTER — TELEPHONE (OUTPATIENT)
Dept: PULMONOLOGY | Age: 69
End: 2024-08-08

## 2024-08-26 ENCOUNTER — TELEPHONE (OUTPATIENT)
Dept: PULMONOLOGY | Age: 69
End: 2024-08-26

## 2024-11-19 ENCOUNTER — TRANSCRIBE ORDERS (OUTPATIENT)
Dept: ADMINISTRATIVE | Facility: HOSPITAL | Age: 69
End: 2024-11-19
Payer: MEDICARE

## 2024-11-19 DIAGNOSIS — I49.9 CARDIAC ARRHYTHMIA, UNSPECIFIED CARDIAC ARRHYTHMIA TYPE: Primary | ICD-10-CM

## 2024-11-19 DIAGNOSIS — I49.3 FREQUENT PVCS: ICD-10-CM

## 2024-12-19 ENCOUNTER — HOSPITAL ENCOUNTER (OUTPATIENT)
Dept: CARDIOLOGY | Facility: HOSPITAL | Age: 69
Discharge: HOME | End: 2024-12-19
Admitting: FAMILY MEDICINE
Payer: MEDICARE

## 2024-12-19 VITALS
BODY MASS INDEX: 43.82 KG/M2 | DIASTOLIC BLOOD PRESSURE: 83 MMHG | SYSTOLIC BLOOD PRESSURE: 134 MMHG | WEIGHT: 238.1 LBS | HEIGHT: 62 IN

## 2024-12-19 DIAGNOSIS — I49.9 CARDIAC ARRHYTHMIA, UNSPECIFIED CARDIAC ARRHYTHMIA TYPE: ICD-10-CM

## 2024-12-19 DIAGNOSIS — I49.3 FREQUENT PVCS: ICD-10-CM

## 2024-12-19 LAB
BH CV ECHO LEFT VENTRICLE GLOBAL LONGITUDINAL STRAIN: 19.7 %
BH CV ECHO MEAS - AO MAX PG: 6.9 MMHG
BH CV ECHO MEAS - AO MEAN PG: 3.6 MMHG
BH CV ECHO MEAS - AO ROOT DIAM: 2.8 CM
BH CV ECHO MEAS - AO V2 MAX: 131 CM/SEC
BH CV ECHO MEAS - AO V2 VTI: 28.6 CM
BH CV ECHO MEAS - AVA(I,D): 3.3 CM2
BH CV ECHO MEAS - EDV(CUBED): 129.3 ML
BH CV ECHO MEAS - ESV(CUBED): 27.7 ML
BH CV ECHO MEAS - FS: 40.2 %
BH CV ECHO MEAS - IVS/LVPW: 1.43 CM
BH CV ECHO MEAS - IVSD: 1.14 CM
BH CV ECHO MEAS - LA DIMENSION: 4.3 CM
BH CV ECHO MEAS - LAT PEAK E' VEL: 9 CM/SEC
BH CV ECHO MEAS - LV MASS(C)D: 177 GRAMS
BH CV ECHO MEAS - LV MAX PG: 5.4 MMHG
BH CV ECHO MEAS - LV MEAN PG: 2.39 MMHG
BH CV ECHO MEAS - LV V1 MAX: 116.3 CM/SEC
BH CV ECHO MEAS - LV V1 VTI: 23 CM
BH CV ECHO MEAS - LVIDD: 5.1 CM
BH CV ECHO MEAS - LVIDS: 3 CM
BH CV ECHO MEAS - LVOT AREA: 4.1 CM2
BH CV ECHO MEAS - LVOT DIAM: 2.27 CM
BH CV ECHO MEAS - LVPWD: 0.8 CM
BH CV ECHO MEAS - MED PEAK E' VEL: 8 CM/SEC
BH CV ECHO MEAS - MV A MAX VEL: 75.2 CM/SEC
BH CV ECHO MEAS - MV DEC SLOPE: 363.1 CM/SEC2
BH CV ECHO MEAS - MV DEC TIME: 0.24 SEC
BH CV ECHO MEAS - MV E MAX VEL: 86.5 CM/SEC
BH CV ECHO MEAS - MV E/A: 1.15
BH CV ECHO MEAS - MV MAX PG: 3.2 MMHG
BH CV ECHO MEAS - MV MEAN PG: 1.27 MMHG
BH CV ECHO MEAS - MV V2 VTI: 29.5 CM
BH CV ECHO MEAS - MVA(VTI): 3.1 CM2
BH CV ECHO MEAS - PA V2 MAX: 106.6 CM/SEC
BH CV ECHO MEAS - RV MAX PG: 5.9 MMHG
BH CV ECHO MEAS - RV V1 MAX: 121.8 CM/SEC
BH CV ECHO MEAS - RV V1 VTI: 23.4 CM
BH CV ECHO MEAS - RVDD: 2.6 CM
BH CV ECHO MEAS - SV(LVOT): 93 ML
BH CV ECHO MEAS - SVI(LVOT): 45.1 ML/M2
BH CV ECHO MEAS - TAPSE (>1.6): 2.3 CM
BH CV ECHO MEASUREMENTS AVERAGE E/E' RATIO: 10.18
BH CV XLRA - RV BASE: 2.5 CM
BH CV XLRA - RV MID: 1.3 CM
BH CV XLRA - TDI S': 17 CM/SEC
LEFT ATRIUM VOLUME INDEX: 43 ML/M2
LEFT ATRIUM VOLUME: 89 ML

## 2024-12-19 PROCEDURE — 93356 MYOCRD STRAIN IMG SPCKL TRCK: CPT

## 2024-12-19 PROCEDURE — 93306 TTE W/DOPPLER COMPLETE: CPT

## 2025-01-13 ENCOUNTER — OFFICE VISIT (OUTPATIENT)
Dept: UROLOGY | Age: 70
End: 2025-01-13
Payer: MEDICARE

## 2025-01-13 VITALS — BODY MASS INDEX: 42.14 KG/M2 | TEMPERATURE: 98.1 F | WEIGHT: 229 LBS | HEIGHT: 62 IN

## 2025-01-13 DIAGNOSIS — R31.9 HEMATURIA SYNDROME: ICD-10-CM

## 2025-01-13 DIAGNOSIS — R31.0 GROSS HEMATURIA: ICD-10-CM

## 2025-01-13 DIAGNOSIS — N32.89 BLADDER MASS: Primary | ICD-10-CM

## 2025-01-13 LAB
APPEARANCE FLUID: CLEAR
BILIRUBIN, POC: NORMAL
BLOOD URINE, POC: NORMAL
CLARITY, POC: CLEAR
COLOR, POC: YELLOW
GLUCOSE URINE, POC: NORMAL MG/DL
KETONES, POC: NORMAL MG/DL
LEUKOCYTE EST, POC: NORMAL
NITRITE, POC: NORMAL
PH, POC: 6
PROTEIN, POC: NORMAL MG/DL
SPECIFIC GRAVITY, POC: 1.01
UROBILINOGEN, POC: 0.2 MG/DL

## 2025-01-13 PROCEDURE — 1090F PRES/ABSN URINE INCON ASSESS: CPT | Performed by: UROLOGY

## 2025-01-13 PROCEDURE — 99204 OFFICE O/P NEW MOD 45 MIN: CPT | Performed by: UROLOGY

## 2025-01-13 PROCEDURE — G8400 PT W/DXA NO RESULTS DOC: HCPCS | Performed by: UROLOGY

## 2025-01-13 PROCEDURE — 1159F MED LIST DOCD IN RCRD: CPT | Performed by: UROLOGY

## 2025-01-13 PROCEDURE — G8427 DOCREV CUR MEDS BY ELIG CLIN: HCPCS | Performed by: UROLOGY

## 2025-01-13 PROCEDURE — G8417 CALC BMI ABV UP PARAM F/U: HCPCS | Performed by: UROLOGY

## 2025-01-13 PROCEDURE — 3017F COLORECTAL CA SCREEN DOC REV: CPT | Performed by: UROLOGY

## 2025-01-13 PROCEDURE — 81002 URINALYSIS NONAUTO W/O SCOPE: CPT | Performed by: UROLOGY

## 2025-01-13 PROCEDURE — 1123F ACP DISCUSS/DSCN MKR DOCD: CPT | Performed by: UROLOGY

## 2025-01-13 PROCEDURE — 1036F TOBACCO NON-USER: CPT | Performed by: UROLOGY

## 2025-01-13 RX ORDER — TIRZEPATIDE 5 MG/.5ML
INJECTION, SOLUTION SUBCUTANEOUS
COMMUNITY
Start: 2024-07-25

## 2025-01-13 RX ORDER — MAGNESIUM OXIDE/MAG AA CHELATE 300 MG
1 CAPSULE ORAL EVERY 24 HOURS
COMMUNITY

## 2025-01-13 RX ORDER — VITAMIN B COMPLEX
CAPSULE ORAL DAILY
COMMUNITY

## 2025-01-13 RX ORDER — OMEPRAZOLE 40 MG/1
40 CAPSULE, DELAYED RELEASE ORAL DAILY
COMMUNITY

## 2025-01-13 RX ORDER — CHLORAL HYDRATE 500 MG
1000 CAPSULE ORAL EVERY 24 HOURS
COMMUNITY

## 2025-01-13 NOTE — PROGRESS NOTES
Janay Lugo is a 69 y.o. female who presents today   Chief Complaint   Patient presents with    New Patient     Bladder mass. Ct done at [facility].       Hematuria  Patient complains of gross hematuria. Onset of hematuria was 3 weeks ago and was sudden in onset. There is not a history of nephrolithiasis. There is not a history of urologic trauma. Other urologic symptoms include none she denies flank pain no dysuria. Patient admits to history of tobacco use and no risk factors for cancer. Patient denies history of occupational exposure. Prior workup has been UA'S, CT.  Patient had a CT scan abdomen pelvis without contrast on 2024 done at [facility] that showed a nonobstructing stone within the left kidney.  There is no hydronephrosis.  In the bladder there was a small 6 to 7 mm hyperdense mass in the dependent portion of the urinary bladder on the left side with a small 2 mm calcification next to it.  She comes in now for further evaluation of the hematuria has resolved    Past Medical History:   Diagnosis Date    GERD (gastroesophageal reflux disease)     Heart palpitations     Hypertension     Hypothyroidism     Osteoarthritis     Type II or unspecified type diabetes mellitus without mention of complication, not stated as uncontrolled        Past Surgical History:   Procedure Laterality Date     SECTION      CHOLECYSTECTOMY      EYE SURGERY      as a child    TUBAL LIGATION         Current Outpatient Medications   Medication Sig Dispense Refill    B Complex CAPS Take by mouth daily      Tirzepatide (MOUNJARO) 5 MG/0.5ML SOAJ 5mg Subcutaneous weekly for 90 days      ZINC PICOLINATE PO Take by mouth daily      omeprazole (PRILOSEC) 40 MG delayed release capsule Take 1 capsule by mouth daily      Omega-3 Fatty Acids (FISH OIL) 1000 MG capsule Take 1 capsule by mouth every 24 hours      Magnesium 300 MG CAPS Take 1 capsule by mouth every 24 hours      FLUoxetine (PROZAC) 20 MG capsule Take 1

## 2025-01-21 ENCOUNTER — OFFICE VISIT (OUTPATIENT)
Dept: CARDIOLOGY | Facility: CLINIC | Age: 70
End: 2025-01-21
Payer: MEDICARE

## 2025-01-21 VITALS
BODY MASS INDEX: 41.96 KG/M2 | WEIGHT: 228 LBS | SYSTOLIC BLOOD PRESSURE: 124 MMHG | OXYGEN SATURATION: 98 % | DIASTOLIC BLOOD PRESSURE: 60 MMHG | HEIGHT: 62 IN | HEART RATE: 86 BPM

## 2025-01-21 DIAGNOSIS — I10 PRIMARY HYPERTENSION: ICD-10-CM

## 2025-01-21 DIAGNOSIS — E66.01 MORBID OBESITY: ICD-10-CM

## 2025-01-21 DIAGNOSIS — I48.3 TYPICAL ATRIAL FLUTTER: ICD-10-CM

## 2025-01-21 DIAGNOSIS — I48.0 PAF (PAROXYSMAL ATRIAL FIBRILLATION): Primary | ICD-10-CM

## 2025-01-21 DIAGNOSIS — G47.33 OBSTRUCTIVE SLEEP APNEA SYNDROME: ICD-10-CM

## 2025-01-21 DIAGNOSIS — I49.1 PAC (PREMATURE ATRIAL CONTRACTION): ICD-10-CM

## 2025-01-21 PROBLEM — G47.30 SLEEP APNEA: Chronic | Status: ACTIVE | Noted: 2018-09-25

## 2025-01-21 RX ORDER — ASPIRIN 81 MG/1
81 TABLET ORAL DAILY
Start: 2025-01-21

## 2025-01-21 NOTE — PROGRESS NOTES
[facility] - CARDIOLOGY  New Patient Initial Outpatient Evaluation    Primary Care Physician: Yohan Lockhart MD    Subjective     Chief Complaint: arrhtyhmas    History of Present Illness  The patient is a 69-year-old female who presents for a new patient evaluation. The chief complaint is PVCs.    She was referred by Dr. Lockhart for palpitations. She wore a heart monitor from [employer] from 10/24/2024 to 11/07/2024, which showed no sustained arrhythmias but a 5.3% burden of PACs and a 1.1% burden of PVCs. She reported symptoms on 10/25/2024 around 4 PM, including fatigue and palpitations, and used the device after one isolated PAC. An echocardiogram performed in 12/2024 showed a structurally normal heart. She has been experiencing occasional irregular heartbeats, described as isolated skipped beats, which are not bothersome and do not occur frequently enough to cause discomfort. She recalls an episode in 08/2024 when her heart rate spiked to 143 while at rest, which she managed with metoprolol. She is not experiencing any chest tightness, pressure, heaviness, or difficulty breathing with activity. She has discontinued follow-up with Dr. Diaz, who performed her ablations, due to her reluctance to continue anticoagulation therapy. She is currently on baby aspirin. She was previously seen for atrial fibrillation diagnosed in 08/2018. She underwent KAHLIL-guided cardioversion and felt better immediately but started having symptomatic paroxysms a few days later. She was initially treated with Multaq, then switched to flecainide on 09/26/2018 with plans for another cardioversion. However, she returned in sinus rhythm that day. Atrial fibrillation recurred in the last week of 10/2018, leading to another cardioversion on 11/02/2018, but she remained in sinus rhythm for less than 24 hours. She was referred to Dr. Diaz in [place] and underwent ablation, including pulmonary vein isolation for atrial fibrillation  and a cavotricuspid isthmus for typical atrial flutter, on 2018. She was last seen on 2019, doing well and maintaining sinus rhythm. She was concerned about the cost of Xarelto and was advised to discuss the need for ongoing anticoagulation with Dr. Diaz. She was due for a follow-up in 12 months but was lost to follow-up, presumably due to the COVID-19 pandemic.    She has a history of sleep apnea but is not currently using her CPAP machine due to discomfort. She has expressed disinterest in the Inspire device.    She has been prescribed Mounjaro by Dr. Lockhart for weight loss and diabetes management, resulting in an 18-pound weight loss. However, she has reached a plateau in her weight loss journey.    Supplemental Information  She has had cataract surgeries.    FAMILY HISTORY  Her mother  of COPD.    MEDICATIONS  Current: Metoprolol, aspirin, Mounjaro.  Past: Xarelto, Multaq, flecainide.      Review of Systems   Constitutional: Positive for malaise/fatigue.   Cardiovascular:  Positive for irregular heartbeat and palpitations. Negative for chest pain, claudication, dyspnea on exertion, leg swelling, near-syncope, orthopnea, paroxysmal nocturnal dyspnea and syncope.   Respiratory:  Negative for shortness of breath.    Hematologic/Lymphatic: Does not bruise/bleed easily.        Otherwise complete ROS reviewed and negative except as mentioned in the HPI.      Past Medical History:   Past Medical History:   Diagnosis Date    Abnormal ECG     Atrial fibrillation     Coronary artery disease     Diabetes mellitus     Disease of thyroid gland     Hypertension     Indigestion     Morbid obesity     Sleep apnea     Spinal headache     Varicose veins of leg with pain        Past Surgical History:  Past Surgical History:   Procedure Laterality Date    ABLATION OF DYSRHYTHMIC FOCUS      CARDIAC ABLATION       SECTION      COLONOSCOPY N/A 2023    Procedure: COLONOSCOPY WITH ANESTHESIA;  Surgeon:  Romel Perdomo, ;  Location: [facility] ENDOSCOPY;  Service: Gastroenterology;  Laterality: N/A;  Pre: Encounter for screening for malignant neoplasm of colon;  Post: Diverticulosis;  Yohan Lockhart MD    DILATION AND CURETTAGE, DIAGNOSTIC / THERAPEUTIC  08/2004    emergency    EYE SURGERY Left     for lazy eye, as a child    GALLBLADDER SURGERY  1991    removal    SKIN LESION EXCISION  01/2016    x2    TUBAL ABDOMINAL LIGATION  1990    VARICOSE VEIN SURGERY Right 06/11/2018    Procedure: Radiofrequency ablation of the right lower extremity;  Surgeon: Melchor Jacob DO;  Location: [facility] HYBRID OR 12;  Service: Vascular       Family History: family history includes Arthritis in her father and mother; Cancer in her father; Diabetes in her father and mother; Hypertension in her father and mother; Osteoporosis in her mother; Stroke in her father and mother.    Social History:  reports that she quit smoking about 26 years ago. Her smoking use included cigarettes. She has never used smokeless tobacco. She reports current alcohol use. She reports that she does not use drugs.    Medications:  Prior to Admission medications    Medication Sig Start Date End Date Taking? Authorizing Provider   Cyanocobalamin (B-12 COMPLIANCE INJECTION IJ) Inject  as directed.   Yes Mary Anne Paul MD   FLUoxetine (PROzac) 20 MG capsule Take 1 capsule by mouth Daily. Take 1 capsule in the morning orally once a day   Yes Mary Anne Paul MD   glucosamine-chondroitin 500-400 MG capsule capsule Take 1 capsule by mouth Daily.   Yes Emergency, Nurse Rodrick, RN   levothyroxine (SYNTHROID, LEVOTHROID) 175 MCG tablet Take 1 tablet by mouth Daily.   Yes Mary Anne Paul MD   losartan-hydrochlorothiazide (HYZAAR) 100-25 MG per tablet Take 1 tablet by mouth Daily. Take 1 table orally once a day   Yes Mary Anne Paul MD   Magnesium 300 MG capsule Take 1 capsule by mouth Daily.   Yes Emergency, Nurse Rodrick, RN   meloxicam  "(MOBIC) 15 MG tablet Take 1 tablet by mouth Daily.   Yes Mary Anne Paul MD   METFORMIN HCL PO Take 500 mg by mouth 2 (Two) Times a Day. Take 1 tablet by mouth twice a day with meals   Yes Mary Anne Paul MD   MULTIPLE VITAMIN PO Take  by mouth Daily.   Yes Mary Anne Paul MD   Omega-3 Fatty Acids (fish oil) 1000 MG capsule capsule Take 1 capsule by mouth Daily.   Yes Emergency, Nurse Rodrick RN   Tirzepatide (MOUNJARO SC) Inject  under the skin into the appropriate area as directed.   Yes Mary Anne Paul MD   Zinc 25 MG tablet Take  by mouth Daily.   Yes Mary Anne Paul MD   B Complex Vitamins (VITAMIN B COMPLEX) capsule capsule Take  by mouth Daily.  1/21/25 Yes Mary Anne Paul MD   omeprazole (priLOSEC) 40 MG capsule Take 1 capsule by mouth Daily. Take 1 capsule by mouth once a day  Patient not taking: Reported on 1/21/2025    Mary Anne Paul MD   fluconazole (DIFLUCAN) 100 MG tablet Take 1 tablet by mouth Every 14 (Fourteen) Days.  1/21/25  Emergency, Nurse Rodrick RN   GLUCOSAMINE-CHONDROITIN PO Take  by mouth Daily.  1/21/25  Mary Anne Paul MD   Levothyroxine Sodium 150 MCG/ML solution 1 tablet Daily. 4/27/21 1/21/25  Emergency, Nurse Rodrick RN   Magnesium Hydroxide (MAGNESIA PO) Take  by mouth Daily.  Patient not taking: Reported on 5/8/2023 1/21/25  Mary Anne Paul MD   mupirocin (BACTROBAN) 2 % ointment Apply  topically to the appropriate area as directed 3 (Three) Times a Day. 8/23/21 1/21/25  Jeny Rosa APRN   rivaroxaban (XARELTO) 20 MG tablet Take 1 tablet by mouth Daily.  1/21/25  Mary Anne Paul MD   sucralfate (CARAFATE) 1 g tablet Take 1 tablet by mouth 4 (Four) Times a Day.  1/21/25  Mary Anne Paul MD     Allergies:  Allergies   Allergen Reactions    Nebivolol Hcl Unknown - High Severity     Dropped heart rate       Objective     Vital Signs: /60   Pulse 86   Ht 158 cm (62.21\")   Wt 103 kg (228 lb)   SpO2 98%   BMI " 41.42 kg/m²     Vitals and nursing note reviewed.   Constitutional:       General: Not in acute distress.     Appearance: Not in distress.   Neck:      Vascular: No JVD or JVR. JVD normal.   Pulmonary:      Effort: Pulmonary effort is normal.      Breath sounds: Normal breath sounds.   Cardiovascular:      Normal rate. Regular rhythm.      Murmurs: There is no murmur.      No gallop.  No rub.   Pulses:     Intact distal pulses.   Edema:     Peripheral edema absent.   Skin:     General: Skin is warm and dry.   Neurological:      Mental Status: Alert, oriented to person, place, and time and oriented to person, place and time.       Physical Exam      Results Reviewed:  Results        ECG 12 Lead    Date/Time: 1/21/2025 4:11 PM  Performed by: Peter Stevens MD    Authorized by: Peter Stevens MD  Comparison: compared with previous ECG from 4/30/2019  Rhythm: sinus rhythm  Rate: normal  BPM: 86  QRS axis: normal    Clinical impression: normal ECG            Results for orders placed during the hospital encounter of 12/19/24    Adult Transthoracic Echo Complete W/ Cont if Necessary Per Protocol    Interpretation Summary  Table formatting from the original result was not included.  Images from the original result were not included.      Left ventricular systolic function is normal. Left ventricular ejection fraction appears to be 66 - 70%.    The left atrial cavity is severely dilated.    Normal size and function of the right ventricle.    The right atrial cavity is borderline dilated.    No significant (worsened mild) valvular pathology.    Compared to most recent transthoracic exam from 8/22/2018, no significant change.    Complete Transthoracic Echocardiogram with Complete Doppler, Color Flow and Contrast  Ended on 08/22/2018Accession #: [id number]  Read by: Gautam Mcdaniel MD Status: Final result    Performing Department: [facility] CARDIOLOGY    Result Text      Left ventricular systolic function is normal.  Estimated EF appears to be in the range of 61 - 65%.  Left ventricular wall thickness is consistent with mild-to-moderate concentric hypertrophy.  Trace tricuspid valve regurgitation is present. Estimated right ventricular systolic pressure from tricuspid regurgitation is normal (<35 mmHg).    Independent review of data, my interpretation: Matthews E-patch worn from 10/24/2024 until 11/7/2024 was reviewed by myself, including the raw data.  There were 5.3% isolated PACs.  There were several very short nonsustained episodes of SVT, the longest of which was 6 beats.  There was 1.1% burden of PVCs.  No atrial fibrillation noted.  Predominant rhythm was normal sinus rhythm with average rate 76 bpm.  Patient triggered the device or reported palpitations fatigue once, which occurred after 2 isolated PACs.  Assessment / Plan        Problem List Items Addressed This Visit          Cardiac and Vasculature    Hypertension (Chronic)    PAF (paroxysmal atrial fibrillation) - Primary (Chronic)    Overview     Initially diagnosed 8/28/18, but reports intermittent symptoms for several years prior to this.  CHADS2-VASc=2 (HTN, female)  Successful KAHLIL-guided cardioversion 8/29/18 - felt better in NSR but started having symptomatic paroxysms within days of DCCV despite having been started on Multaq  Started on flecainide 9/26/18 - in NSR following day when arriving for planned cardioversion  Back in symptomatic AF last week of October - underwent DCCV 11/2/18 but only remained in NSR for <24 hours; symptomatic w/recurrence      S/p PVI ablation 11/13/18 by Dr. Diaz         Relevant Orders    ECG 12 Lead    Typical atrial flutter (Chronic)    Overview     Seen on some ECGs, and after first cardioversion on 11/2/18 went from AFib to flutter (requiring an additional cardioversion)    S/p CTI ablation 11/13/18 by Dr. Diaz         Relevant Orders    ECG 12 Lead       Endocrine and Metabolic    Morbid obesity       Sleep    Sleep  "apnea (Chronic)    Relevant Orders    Ambulatory Referral to ENT (Otolaryngology)     Other Visit Diagnoses       PAC (premature atrial contraction)                Assessment & Plan  1. Atrial arrhythmias: Established, stable  She is status post ablation for both atrial fibrillation and atrial flutter in 2018 and is doing well. She has not had any documented recurrences since. Her blood pressure cuff routinely alarms her atrial fibrillation, and she described some of these palpitations to her PCP, who then wisely had her wear a heart monitor last year. We reviewed this in detail and in fact, the irregularity that the blood pressure cuff detects and the palpitations she feels as \"skipped beats\" are thankfully only from isolated PACs. We do not see any recurrence of atrial arrhythmias on this device, but she did have frequent PACs (5.3%). -However, she is not symptomatic relief from these other than the awareness of a skipped beat, and previously was intolerant to low dose daily metoprolol, 12.5 mg daily.   -So, at this point, no other recommendation for treatment changes or for treatment initiation regarding PACs.     We did spend a great deal of time discussing her currently unmitigated stroke risk if atrial fibrillation were to recur. She had taken Xarelto in the past, but was concerned about potential side effects and cost concerns with it. She does state that she is taking aspirin now. Her [id number] is currently a 5.   -We discussed that aspirin alone only offers about a 0.5% reduction in that stroke risk. Should atrial arrhythmias recur, she does express concern regarding being switched back to an anticoagulant due to frequent motorcycle riding and risk for injury. So, I did discuss Watchman as an alternative with her. She received education materials and will call us back if she might like to pursue this further.    We did discuss the direct impact on burden and likelihood of recurrence of atrial arrhythmias " due to morbid obesity and untreated sleep apnea.  - She is on Mounjaro currently and initially saw a good response, followed by a plateau with regards to weight loss. I advised her to discuss this further with Dr. Lockhart, who may want to increase the dose.   -Regarding her sleep apnea, she has been intolerant to CPAP, so I did discuss with her the availability of the Inspire device and ultimately she requested a referral to Dr. Abdirizak Kohler here, so I gladly placed this so that she could discuss this further with the surgeon that implants it.     Asked patient to continue aspirin 81 mg daily for now and if she does have any change in symptoms to suggest a recurrence, I advised her to check using her Apple watch and feel free to upload a PDF of this to [employer] and alert me for review. If otherwise we cannot tell from that, then we may need to have her come in for a 12-lead and certainly if she does have a confirmed recurrence, we will need to implement full anticoagulation immediately with either Eliquis or Xarelto, all this of course assuming that she does not proceed with Watchman. Otherwise, as we discussed, with a successful Watchman implant, she will be fully protected and only need antiplatelet therapy coverage moving forward.    2. Frequent PACs.  As per above, 5% burden but overall not bothered by this and no reason for additional treatment.    3. Essential hypertension.  Well controlled. Continue current medications.    Follow-up  The patient will follow up in 1 year or sooner with any new or worsening symptoms or documented recurrence.          Transcribed from ambient dictation for Peter Stevens MD by Peter Stevens MD.  01/21/25   15:35 CST    Patient or patient representative verbalized consent to the visit recording.

## 2025-01-27 ENCOUNTER — HOSPITAL ENCOUNTER (OUTPATIENT)
Dept: CT IMAGING | Age: 70
Discharge: HOME | End: 2025-01-27
Attending: UROLOGY
Payer: MEDICARE

## 2025-01-27 DIAGNOSIS — R31.0 GROSS HEMATURIA: ICD-10-CM

## 2025-01-27 DIAGNOSIS — R31.9 HEMATURIA SYNDROME: ICD-10-CM

## 2025-01-27 LAB
CREAT SERPL-MCNC: 0.7 MG/DL (ref 0.3–1.3)
PERFORMED ON: NORMAL

## 2025-01-27 PROCEDURE — 82565 ASSAY OF CREATININE: CPT

## 2025-01-27 PROCEDURE — 74178 CT ABD&PLV WO CNTR FLWD CNTR: CPT

## 2025-01-27 PROCEDURE — 6360000004 HC RX CONTRAST MEDICATION: Performed by: UROLOGY

## 2025-01-27 RX ORDER — IOPAMIDOL 755 MG/ML
70 INJECTION, SOLUTION INTRAVASCULAR
Status: COMPLETED | OUTPATIENT
Start: 2025-01-27 | End: 2025-01-27

## 2025-01-27 RX ADMIN — IOPAMIDOL 70 ML: 755 INJECTION, SOLUTION INTRAVENOUS at 14:10

## 2025-02-10 ENCOUNTER — PREP FOR PROCEDURE (OUTPATIENT)
Dept: UROLOGY | Age: 70
End: 2025-02-10

## 2025-02-10 ENCOUNTER — PROCEDURE VISIT (OUTPATIENT)
Dept: UROLOGY | Age: 70
End: 2025-02-10
Payer: MEDICARE

## 2025-02-10 VITALS — HEIGHT: 62 IN | BODY MASS INDEX: 42.51 KG/M2 | WEIGHT: 231 LBS | TEMPERATURE: 97 F

## 2025-02-10 DIAGNOSIS — R31.0 GROSS HEMATURIA: ICD-10-CM

## 2025-02-10 DIAGNOSIS — N32.89 BLADDER MASS: Primary | ICD-10-CM

## 2025-02-10 DIAGNOSIS — C67.2 MALIGNANT NEOPLASM OF LATERAL WALL OF URINARY BLADDER (HCC): Primary | ICD-10-CM

## 2025-02-10 DIAGNOSIS — N32.89 BLADDER MASS: ICD-10-CM

## 2025-02-10 PROBLEM — C67.9 BLADDER CANCER (HCC): Status: ACTIVE | Noted: 2025-02-10

## 2025-02-10 LAB
APPEARANCE FLUID: CLEAR
BILIRUBIN, POC: NORMAL
BLOOD URINE, POC: NORMAL
CLARITY, POC: CLEAR
COLOR, POC: YELLOW
GLUCOSE URINE, POC: NORMAL MG/DL
KETONES, POC: NORMAL MG/DL
LEUKOCYTE EST, POC: NORMAL
NITRITE, POC: NORMAL
PH, POC: 6.5
PROTEIN, POC: NORMAL MG/DL
SPECIFIC GRAVITY, POC: 1.02
UROBILINOGEN, POC: 0.2 MG/DL

## 2025-02-10 PROCEDURE — G8427 DOCREV CUR MEDS BY ELIG CLIN: HCPCS | Performed by: UROLOGY

## 2025-02-10 PROCEDURE — G8417 CALC BMI ABV UP PARAM F/U: HCPCS | Performed by: UROLOGY

## 2025-02-10 PROCEDURE — 1123F ACP DISCUSS/DSCN MKR DOCD: CPT | Performed by: UROLOGY

## 2025-02-10 PROCEDURE — 81002 URINALYSIS NONAUTO W/O SCOPE: CPT | Performed by: UROLOGY

## 2025-02-10 PROCEDURE — 1090F PRES/ABSN URINE INCON ASSESS: CPT | Performed by: UROLOGY

## 2025-02-10 PROCEDURE — 1159F MED LIST DOCD IN RCRD: CPT | Performed by: UROLOGY

## 2025-02-10 PROCEDURE — 1036F TOBACCO NON-USER: CPT | Performed by: UROLOGY

## 2025-02-10 PROCEDURE — G8400 PT W/DXA NO RESULTS DOC: HCPCS | Performed by: UROLOGY

## 2025-02-10 PROCEDURE — 3017F COLORECTAL CA SCREEN DOC REV: CPT | Performed by: UROLOGY

## 2025-02-10 PROCEDURE — 99214 OFFICE O/P EST MOD 30 MIN: CPT | Performed by: UROLOGY

## 2025-02-10 PROCEDURE — 52000 CYSTOURETHROSCOPY: CPT | Performed by: UROLOGY

## 2025-02-10 NOTE — PROGRESS NOTES
Janay Lugo is a 69 y.o. female who presents today   Chief Complaint   Patient presents with    Cystoscopy     I am here today for my cysto. My CT is done.      Hematuria  Patient complains of gross hematuria. Onset of hematuria was 3 weeks ago and was sudden in onset. There is not a history of nephrolithiasis. There is not a history of urologic trauma. Other urologic symptoms include none she denies flank pain no dysuria. Patient admits to history of tobacco use and no risk factors for cancer. Patient denies history of occupational exposure. Prior workup has been UA'S, CT.  Patient had a CT scan abdomen pelvis without contrast on 2024 done at [facility] [place], operated by [facility] that showed a nonobstructing stone within the left kidney.  There is no hydronephrosis.  In the bladder there was a small 6 to 7 mm hyperdense mass in the dependent portion of the urinary bladder on the left side with a small 2 mm calcification next to it.  She comes in now for further evaluation of the hematuria has resolved     Patient with above history.  She comes in today for cystoscopy for evaluation of bladder mass seen on previous CT and for hematuria..  She also had a CT urogram.      Past Medical History:   Diagnosis Date    GERD (gastroesophageal reflux disease)     Heart palpitations     Hypertension     Hypothyroidism     Osteoarthritis     Type II or unspecified type diabetes mellitus without mention of complication, not stated as uncontrolled        Past Surgical History:   Procedure Laterality Date     SECTION      CHOLECYSTECTOMY      EYE SURGERY      as a child    TUBAL LIGATION         Current Outpatient Medications   Medication Sig Dispense Refill    B Complex CAPS Take by mouth daily      Tirzepatide (MOUNJARO) 5 MG/0.5ML SOAJ 5mg Subcutaneous weekly for 90 days      ZINC PICOLINATE PO Take by mouth daily      omeprazole (PRILOSEC) 40 MG delayed release capsule Take 1 capsule by mouth daily      Omega-3 Fatty Acids

## 2025-02-17 ENCOUNTER — TELEPHONE (OUTPATIENT)
Dept: UROLOGY | Age: 70
End: 2025-02-17

## 2025-02-17 NOTE — TELEPHONE ENCOUNTER
I received a message from preadmission testing  saying that when they called patient to schedule for pre-op she said she was cancelling her surgery in order to seek a second opinion. I have gotten patient's surgery cancelled at this time.

## 2025-02-24 ENCOUNTER — TELEPHONE (OUTPATIENT)
Dept: UROLOGY | Age: 70
End: 2025-02-24

## 2025-03-04 ENCOUNTER — TELEPHONE (OUTPATIENT)
Dept: UROLOGY | Age: 70
End: 2025-03-04

## 2025-03-04 NOTE — TELEPHONE ENCOUNTER
Imaging and notes have been printed for patient to pickup in office. Please contact patient for clinical questions. Patients surgery for 3/5/25 was canceled.

## 2025-03-04 NOTE — TELEPHONE ENCOUNTER
Janay saw Dr Abrahan Olson on 2-10-25 and had a cystoscopy and had a CT on her pelvis with and without contrast on 1-27-25. She would like to come to the office and  those results. She also wants to know if she has a mass in her bladder.   Please call to advise when she can pick those up.     # [phone]  anytime       Thank you

## 2025-03-17 ENCOUNTER — TRANSCRIBE ORDERS (OUTPATIENT)
Dept: ADMINISTRATIVE | Facility: HOSPITAL | Age: 70
End: 2025-03-17
Payer: MEDICARE

## 2025-03-17 DIAGNOSIS — R92.8 ABNORMAL MAMMOGRAM: Primary | ICD-10-CM

## 2025-06-25 ENCOUNTER — OFFICE VISIT (OUTPATIENT)
Age: 70
End: 2025-06-25
Payer: MEDICARE

## 2025-06-25 VITALS
SYSTOLIC BLOOD PRESSURE: 132 MMHG | BODY MASS INDEX: 42.89 KG/M2 | WEIGHT: 233.1 LBS | HEIGHT: 62 IN | DIASTOLIC BLOOD PRESSURE: 84 MMHG

## 2025-06-25 DIAGNOSIS — N89.8 VAGINAL ITCHING: Primary | ICD-10-CM

## 2025-06-25 DIAGNOSIS — L90.0 LICHEN SCLEROSUS: ICD-10-CM

## 2025-06-25 RX ORDER — BETAMETHASONE DIPROPIONATE 0.5 MG/G
CREAM TOPICAL
COMMUNITY
Start: 2025-06-19 | End: 2025-06-25

## 2025-06-25 RX ORDER — CLOBETASOL PROPIONATE 0.5 MG/G
1 CREAM TOPICAL DAILY
Qty: 30 G | Refills: 1 | Status: SHIPPED | OUTPATIENT
Start: 2025-06-25

## 2025-06-25 NOTE — PROGRESS NOTES
Chief Complaint   Patient presents with    Follow-up     New pt here for recurring yeast infections, reports external vaginal itching and irritation, reports treating it with fluconazole, miconazole and boric acid, along with anti itch cream. States she does not have any discharge. Last mammo 2025, Last pap 2009 normal., no hx of abnormal       History:  Nia Barker is a 69 y.o. female who presents today for evaluation of the above problems. She reports that she began having vaginal itching since March. Her PCP has ordered diflucan for her several times. Her PCP also treated her with an antibiotic for a bacterial infection. No discharge noted by patient. She states that she notices an odor at times.   No concerns for STD. Not sexually active as her  has metastatic prostate cancer.     Thin, white patches of skin noted to EVELIO labia that extend to rectal area. Consistent with lichen sclerosus.   Vaginal dryness noted on internal exam. Discussed use of coconut oil to moisturize tissue.            Allergies   Allergen Reactions    Nebivolol Hcl Unknown - High Severity     Dropped heart rate     Past Medical History:   Diagnosis Date    Abnormal ECG     Atrial fibrillation     Bladder cancer 2025    Coronary artery disease     Diabetes mellitus     Disease of thyroid gland     GERD (gastroesophageal reflux disease)     Hypertension     Indigestion     Irritable bowel syndrome     Morbid obesity     Sleep apnea     Spinal headache     Varicose veins of leg with pain      Past Surgical History:   Procedure Laterality Date    ABLATION OF DYSRHYTHMIC FOCUS      CARDIAC ABLATION      CATARACT EXTRACTION       SECTION      CHOLECYSTECTOMY      COLONOSCOPY N/A 2023    Procedure: COLONOSCOPY WITH ANESTHESIA;  Surgeon: Romel Perdomo DO;  Location: [facility] ENDOSCOPY;  Service: Gastroenterology;  Laterality: N/A;  Pre: Encounter for screening for malignant neoplasm of colon;  Post:  Diverticulosis;  Yohan Lockhart MD    DILATION AND CURETTAGE, DIAGNOSTIC / THERAPEUTIC  08/2004    emergency    EYE SURGERY Left     for lazy eye, as a child    GALLBLADDER SURGERY  1991    removal    SKIN LESION EXCISION  01/2016    x2    TUBAL ABDOMINAL LIGATION  1990    UPPER GASTROINTESTINAL ENDOSCOPY  1988    VARICOSE VEIN SURGERY Right 06/11/2018    Procedure: Radiofrequency ablation of the right lower extremity;  Surgeon: Melchor Jacob DO;  Location: [facility] HYBRID OR 12;  Service: Vascular     Family History   Problem Relation Age of Onset    Diabetes Mother     Arthritis Mother     Stroke Mother     Osteoporosis Mother     Hypertension Mother     Hypertension Father     Diabetes Father     Arthritis Father     Stroke Father     Cancer Father     Colon cancer Father       reports that she quit smoking about 26 years ago. Her smoking use included cigarettes. She has never used smokeless tobacco. She reports current alcohol use. She reports that she does not use drugs.      Current Outpatient Medications:     aspirin 81 MG EC tablet, Take 1 tablet by mouth Daily., Disp: , Rfl:     Cyanocobalamin (B-12 COMPLIANCE INJECTION IJ), Inject  as directed., Disp: , Rfl:     FLUoxetine (PROzac) 20 MG capsule, Take 1 capsule by mouth Daily. Take 1 capsule in the morning orally once a day, Disp: , Rfl:     glucosamine-chondroitin 500-400 MG capsule capsule, Take 1 capsule by mouth Daily., Disp: , Rfl:     levothyroxine (SYNTHROID, LEVOTHROID) 175 MCG tablet, Take 1 tablet by mouth Daily., Disp: , Rfl:     losartan-hydrochlorothiazide (HYZAAR) 100-25 MG per tablet, Take 1 tablet by mouth Daily. Take 1 table orally once a day, Disp: , Rfl:     Magnesium 300 MG capsule, Take 1 capsule by mouth Daily., Disp: , Rfl:     meloxicam (MOBIC) 15 MG tablet, Take 1 tablet by mouth Daily., Disp: , Rfl:     METFORMIN HCL PO, Take 500 mg by mouth 2 (Two) Times a Day. Take 1 tablet by mouth twice a day with meals, Disp: , Rfl:  "    MULTIPLE VITAMIN PO, Take  by mouth Daily., Disp: , Rfl:     Omega-3 Fatty Acids (fish oil) 1000 MG capsule capsule, Take 1 capsule by mouth Daily., Disp: , Rfl:     Salicylic Acid 0.5 % lotion, LOAN AA ONCE D, Disp: , Rfl:     Zinc 25 MG tablet, Take  by mouth Daily., Disp: , Rfl:     clobetasol propionate (TEMOVATE) 0.05 % cream, Apply 1 Application topically to the appropriate area as directed Daily. Apply 1 application to the affected skin once daily for two weeks, then apply once every other day., Disp: 30 g, Rfl: 1    OBJECTIVE:  /84 (BP Location: Right arm, Patient Position: Sitting)   Ht 158 cm (62.21\")   Wt 106 kg (233 lb 1.6 oz)   Breastfeeding No   BMI 42.35 kg/m²    Physical Exam  Exam conducted with a chaperone present (Kitty RAMOS MA).   Constitutional:       Appearance: Normal appearance.   Cardiovascular:      Rate and Rhythm: Normal rate and regular rhythm.      Heart sounds: Normal heart sounds, S1 normal and S2 normal.   Pulmonary:      Effort: Pulmonary effort is normal.      Breath sounds: Normal breath sounds.   Chest:   Breasts:     Right: Normal. No swelling, bleeding, inverted nipple, mass, nipple discharge, skin change or tenderness.      Left: Normal. No swelling, bleeding, inverted nipple, mass, nipple discharge, skin change or tenderness.   Abdominal:      General: Bowel sounds are normal.      Palpations: Abdomen is soft.      Hernia: There is no hernia in the left inguinal area or right inguinal area.   Genitourinary:     Exam position: Lithotomy position.      Pubic Area: No rash or pubic lice.       Labia:         Right: No rash, tenderness or injury.         Left: No rash, tenderness or injury.       Vagina: No signs of injury and foreign body. No tenderness or bleeding.      Cervix: Normal.      Uterus: Normal.       Adnexa: Right adnexa normal and left adnexa normal.      Comments: Thin, white, patches of skin on EVELIO labia that extends to rectal area consistent with " lichen sclerosus  Vaginal dryness noted   Musculoskeletal:         General: Normal range of motion.      Cervical back: Normal range of motion.   Lymphadenopathy:      Lower Body: No right inguinal adenopathy. No left inguinal adenopathy.   Skin:     General: Skin is warm and dry.   Neurological:      Mental Status: She is alert and oriented to person, place, and time.   Psychiatric:         Attention and Perception: Attention and perception normal.         Mood and Affect: Mood and affect normal.         Speech: Speech normal.         Behavior: Behavior normal. Behavior is cooperative.         Thought Content: Thought content normal.       Assessment/Plan    Diagnoses and all orders for this visit:    1. Vaginal itching (Primary)  -     NuSwab Vaginitis (VG) - Swab, Cervix  -     Genital Mycoplasmas VITOR, Swab - Swab, Cervix    2. Lichen sclerosus  -     clobetasol propionate (TEMOVATE) 0.05 % cream; Apply 1 Application topically to the appropriate area as directed Daily. Apply 1 application to the affected skin once daily for two weeks, then apply once every other day.  Dispense: 30 g; Refill: 1           An After Visit Summary was printed and given to the patient at discharge.  Return in about 4 weeks (around 7/23/2025) for med check, recheck lichens.          RICARDO Huerta

## 2025-06-28 LAB
A VAGINAE DNA VAG QL NAA+PROBE: NORMAL SCORE
BVAB2 DNA VAG QL NAA+PROBE: NORMAL SCORE
C ALBICANS DNA VAG QL NAA+PROBE: NEGATIVE
C GLABRATA DNA VAG QL NAA+PROBE: NEGATIVE
M GENITALIUM DNA SPEC QL NAA+PROBE: NEGATIVE
M HOMINIS DNA SPEC QL NAA+PROBE: NEGATIVE
MEGA1 DNA VAG QL NAA+PROBE: NORMAL SCORE
T VAGINALIS DNA VAG QL NAA+PROBE: NEGATIVE
UREAPLASMA DNA SPEC QL NAA+PROBE: NEGATIVE

## (undated) DEVICE — FRCP BX RADJAW4 NDL 2.8 240 STD OG

## (undated) DEVICE — GEL ULTRASND HI VISC 20G PACKET STRL

## (undated) DEVICE — PAD MINOR UNIVERSAL: Brand: MEDLINE INDUSTRIES, INC.

## (undated) DEVICE — Device: Brand: DEFENDO AIR/WATER/SUCTION AND BIOPSY VALVE

## (undated) DEVICE — NDL SPINE 20G 3 1/2 YEL STRL 1P/U

## (undated) DEVICE — PK TURNOVER RM ADV

## (undated) DEVICE — CATH VASC RF CLOSUREFAST 7CM 7F100CM

## (undated) DEVICE — BNDG ELAS W/CLIP 6IN 10YD LF STRL

## (undated) DEVICE — INTENDED FOR TISSUE SEPARATION, AND OTHER PROCEDURES THAT REQUIRE A SHARP SURGICAL BLADE TO PUNCTURE OR CUT.: Brand: BARD-PARKER ® STAINLESS STEEL BLADES

## (undated) DEVICE — BNDG ADHS CURAD FLX/FABRC 1X3IN STRL LF

## (undated) DEVICE — Device: Brand: MEDEX

## (undated) DEVICE — BNDG ELAS ECON W/CLIP 4IN 5YD LF STRL

## (undated) DEVICE — ST TB EXT STANDARDBORE 30IN

## (undated) DEVICE — APPL CHLORAPREP W/TINT 26ML ORNG

## (undated) DEVICE — CVR PROB GEN PURP W ISOSILK 6X48

## (undated) DEVICE — SYR LL TP 10ML STRL

## (undated) DEVICE — THE CHANNEL CLEANING BRUSH IS A NYLON FLEXI BRUSH ATTACHED TO A FLEXIBLE PLASTIC SHEATH DESIGNED TO SAFELY REMOVE DEBRIS FROM FLEXIBLE ENDOSCOPES.

## (undated) DEVICE — BANDAGE,GAUZE,BULKEE II,4.5"X4.1YD,STRL: Brand: MEDLINE

## (undated) DEVICE — SENSR O2 OXIMAX FNGR A/ 18IN NONSTR

## (undated) DEVICE — PAD,PREPPING,CUFFED,24X48,7",NONSTERILE: Brand: MEDLINE

## (undated) DEVICE — SHEATH INTRO MICRO 7F 11CM

## (undated) DEVICE — SPNG GZ STRL 2S 4X4 12PLY

## (undated) DEVICE — MASK,OXYGEN,MED CONC,ADLT,7' TUB, UC: Brand: PENDING

## (undated) DEVICE — 3M™ STERI-STRIP™ REINFORCED ADHESIVE SKIN CLOSURES, R1547, 1/2 IN X 4 IN (12 MM X 100 MM), 6 STRIPS/ENVELOPE: Brand: 3M™ STERI-STRIP™

## (undated) DEVICE — YANKAUER,BULB TIP WITH VENT: Brand: ARGYLE

## (undated) DEVICE — ST INF 2NDARY 20DRP VNT/NOVNT 30IN

## (undated) DEVICE — GLV SURG SENSICARE W/ALOE PF LF 7.5 STRL